# Patient Record
Sex: MALE
[De-identification: names, ages, dates, MRNs, and addresses within clinical notes are randomized per-mention and may not be internally consistent; named-entity substitution may affect disease eponyms.]

---

## 2019-02-15 ENCOUNTER — HOSPITAL ENCOUNTER (INPATIENT)
Dept: HOSPITAL 14 - H.ER | Age: 74
LOS: 7 days | Discharge: NURSING, REHAB OR LONG-TERM CARE | DRG: 27 | End: 2019-02-22
Attending: FAMILY MEDICINE | Admitting: FAMILY MEDICINE
Payer: MEDICARE

## 2019-02-15 VITALS — BODY MASS INDEX: 24.9 KG/M2

## 2019-02-15 DIAGNOSIS — E11.9: ICD-10-CM

## 2019-02-15 DIAGNOSIS — S06.5X0A: Primary | ICD-10-CM

## 2019-02-15 DIAGNOSIS — Z79.82: ICD-10-CM
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
## 2019-02-15 DIAGNOSIS — E86.0: ICD-10-CM

## 2019-02-15 DIAGNOSIS — Z79.84: ICD-10-CM

## 2019-02-15 DIAGNOSIS — F03.90: ICD-10-CM
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
## 2019-02-15 DIAGNOSIS — E78.00: ICD-10-CM

## 2019-02-15 DIAGNOSIS — M19.90: ICD-10-CM

## 2019-02-15 DIAGNOSIS — I10: ICD-10-CM

## 2019-02-15 DIAGNOSIS — G25.0: ICD-10-CM

## 2019-02-15 DIAGNOSIS — R26.89: ICD-10-CM

## 2019-02-15 DIAGNOSIS — F32.9: ICD-10-CM

## 2019-02-15 DIAGNOSIS — E78.5: ICD-10-CM

## 2019-02-15 LAB
ALBUMIN SERPL-MCNC: 4.8 G/DL (ref 3.5–5)
ALBUMIN/GLOB SERPL: 1.4 {RATIO} (ref 1–2.1)
ALT SERPL-CCNC: 48 U/L (ref 21–72)
ANISOCYTOSIS BLD QL SMEAR: SLIGHT
APTT BLD: 34.3 SECONDS (ref 25.6–37.1)
AST SERPL-CCNC: 52 U/L (ref 17–59)
BASE EXCESS BLDV CALC-SCNC: 1 MMOL/L (ref 0–2)
BASOPHILS # BLD AUTO: 0 K/UL (ref 0–0.2)
BASOPHILS NFR BLD: 0.6 % (ref 0–2)
BUN SERPL-MCNC: 38 MG/DL (ref 9–20)
CALCIUM SERPL-MCNC: 9.9 MG/DL (ref 8.4–10.2)
EOSINOPHIL # BLD AUTO: 0 K/UL (ref 0–0.7)
EOSINOPHIL NFR BLD: 0.2 % (ref 0–4)
ERYTHROCYTE [DISTWIDTH] IN BLOOD BY AUTOMATED COUNT: 14.4 % (ref 11.5–14.5)
GFR NON-AFRICAN AMERICAN: > 60
GIANT PLATELETS BLD QL SMEAR: PRESENT
HGB BLD-MCNC: 15.9 G/DL (ref 12–18)
INR PPP: 1.1
LG PLATELETS BLD QL SMEAR: PRESENT
LYMPHOCYTE: 9 % (ref 20–50)
LYMPHOCYTES # BLD AUTO: 0.6 K/UL (ref 1–4.3)
LYMPHOCYTES NFR BLD AUTO: 7.9 % (ref 20–40)
MCH RBC QN AUTO: 31.8 PG (ref 27–31)
MCHC RBC AUTO-ENTMCNC: 33.6 G/DL (ref 33–37)
MCV RBC AUTO: 94.7 FL (ref 80–94)
MONOCYTE: 1 % (ref 0–10)
MONOCYTES # BLD: 0.4 K/UL (ref 0–0.8)
MONOCYTES NFR BLD: 5.1 % (ref 0–10)
NEUTROPHILS # BLD: 6.6 K/UL (ref 1.8–7)
NEUTROPHILS NFR BLD AUTO: 86 % (ref 42–75)
NEUTROPHILS NFR BLD AUTO: 86.2 % (ref 50–75)
NEUTS BAND NFR BLD: 4 % (ref 0–2)
NRBC BLD AUTO-RTO: 0 % (ref 0–0)
PCO2 BLDV: 42 MMHG (ref 40–60)
PH BLDV: 7.4 [PH] (ref 7.32–7.43)
PLATELET # BLD EST: NORMAL 10*3/UL
PLATELET # BLD: 219 K/UL (ref 130–400)
PMV BLD AUTO: 9.1 FL (ref 7.2–11.7)
POIKILOCYTOSIS BLD QL SMEAR: SLIGHT
PROTHROMBIN TIME: 12.9 SECONDS (ref 9.8–13.1)
RBC # BLD AUTO: 5 MIL/UL (ref 4.4–5.9)
TOTAL CELLS COUNTED BLD: 100
VENOUS BLOOD FIO2: 21 %
VENOUS BLOOD GAS PO2: 62 MM/HG (ref 30–55)
WBC # BLD AUTO: 7.6 K/UL (ref 4.8–10.8)

## 2019-02-15 NOTE — CARD
--------------- APPROVED REPORT --------------





Date of service: 02/15/2019



EKG Measurement

Heart [id number]

UT [id number]

[id number]

[id number]

[id number]



<Conclusion>

Normal sinus rhythm

Possible inferior infarct, age undetermined

Abnormal Electrocardiogram

## 2019-02-15 NOTE — RAD
Date of service: 



02/15/2019



PROCEDURE:  Radiographs of the Chest and Left Ribs.



HISTORY:

fall



COMPARISON:

None available. 



TECHNIQUE:

Frontal radiograph of the chest and multiple oblique radiographs of 

the left ribs were obtained.



FINDINGS:



LEFT RIBS:

No fracture or focal lesion visualized.



LUNGS:

Clear.



PLEURA:

No pneumothorax or pleural fluid.



CARDIOVASCULAR:

Normal cardiac size. No pulmonary vascular congestion. 



No aortic atherosclerotic calcification present



OTHER FINDINGS:

None.



IMPRESSION:

Unremarkable radiographs of the chest and left ribs. No left rib 

fracture.

## 2019-02-15 NOTE — CP.PCM.PN
Subjective





- Date & Time of Evaluation


Date of Evaluation: 02/15/19


Time of Evaluation: 14:52





- Subjective


Subjective: 





Pt fell yesterday 


has gait difficulty


he is awake alert confused


JUAN PABLO EOM full


good st


no sensory defictis


no drift


CT large chronic SDH Right side


Would benefit from elective craniotomy and evacuation


Son left so I will need to speak to him to obtain consent


will leave consent fo son to sign after I speak to him


If there is achnge neurologically will evacuate sooner








Objective





- Vital Signs/Intake and Output


Vital Signs (last 24 hours): 


                                        











Temp Pulse Resp BP Pulse Ox


 


 98 F   69   19   134/80   98 


 


 02/15/19 10:27  02/15/19 13:05  02/15/19 13:05  02/15/19 13:05  02/15/19 13:34











- Labs


Labs: 


                                        





                                 02/15/19 11:15 





                                 02/15/19 11:15 





                                        











PT  12.9 Seconds (9.8-13.1)   02/15/19  11:15    


 


INR  1.1   02/15/19  11:15    


 


APTT  34.3 Seconds (25.6-37.1)   02/15/19  11:15

## 2019-02-15 NOTE — CT
Date of service: 



02/15/2019



PROCEDURE:  CT HEAD WITHOUT CONTRAST.



HISTORY:

headache



COMPARISON:

None available.



TECHNIQUE:

Axial computed tomography images were obtained through the head/brain 

without intravenous contrast.  



Radiation dose:



Total exam DLP = 950.52 mGy-cm.



This CT exam was performed using one or more of the following dose 

reduction techniques: Automated exposure control, adjustment of the 

mA and/or kV according to patient size, and/or use of iterative 

reconstruction technique.



FINDINGS:



HEMORRHAGE:

. the subdural extends approximately 10.5 cm along the right frontal 

convexity.  It measures approximately 3.1 cm in greatest width.  This 

subdural hemorrhages mildly loculated.  There is no other 

intracranial hemorrhage identified. 



BRAIN:

No intracranial mass.  No significant atrophy.  Mild periventricular 

white matter lucency consistent with chronic microvascular ischemic 

change.  No evidence of acute infarct.



VENTRICLES:

No hydrocephalus.  There is midline shift towards the left of 

approximately 4 mm.  There is no downward herniation.  The 

perimesencephalic cistern is preserved. 



CALVARIUM:

Unremarkable.



PARANASAL SINUSES:

No evidence of sinusitis.  There is a deviated nasal septum towards 

the right side in association with a bony spur.



MASTOID AIR CELLS:

Unremarkable as visualized. No inflammatory changes.



OTHER FINDINGS:

None.



IMPRESSION:

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 

width and 10.5 cm length.  4 mm midline shift towards the left as a 

result of mass effect from the subdural collection.  Mildly 

loculated..  No other significant abnormality.

## 2019-02-15 NOTE — ED PDOC
HPI: Trauma/Fall





- HPI


History Per: Patient


Injury Occurred (Timing): Days Ago: (3)


Description Of Injury (Context): Head Trauma


Location Of Injury: Left: Chest, Posterior: Head


Pain Scale Rating Of: 3


Additional Complaint(s): 


Pt is a 72 y/o male brought to ED by EMT called by neighbors home health aid as 

pt was on the floor for 2 days after a fall. Pt states he fell on Tuesday after 

slipping and struck his the back of his head when he tried to get up again. He 

denies LOC. States he was unable to get up because he did not have enough 

strength to pull himself up. He remained on the floor for 2 days during this 

time he did not eat and ended up urinating on himself as he could not go to the 

bathroom. He  normally walks with a cane and was not using one at the time of 

his fall. Reports a similar fall at [facility] week prior. At the moment, 

complains of left rib pain and mild headache. Denies dizziness, blurry vision, 

chest pain, shortness of breath, recent illness (cough/congestion, fevers, 

diarrhea, n/v, dysuria), neck, back, abdominal, hip or knee pain, or hx of 

seizures in the past. 





PMD: Dr. Katz


PMHX: HTN, DM (non inuslin dependent), Depression, HTN, Unable to r/o dementia


PSurg: Denies


Meds: reveiwed


NKDA


Social: Lives alone, as family in area, has , denies smoking, heavy 

alcohol use, or drug use








<Chava Mariscal - Last Filed: 02/15/19 13:32>





<Nick Sanchez - Last Filed: 02/15/19 13:51>





- HPI


Time Seen by Provider: 02/15/19 10:25


Chief Complaint (Nursing): Trauma





Supervising Attending Note





- Supervising Attending Note


The Documented history was done by the: Physician Extender


The documented physical exam was done by the: Physician Extender


The documented procedures were done by the: Physician Extender





- Attestation:


I have personally seen and examined this patient.: Yes


I have fully participated in the care of the patient.: Yes


I have reviewed all pertinent clinical information, including history, physical 

exam and plan: Yes





- Notes:


Notes:: 





Head injury from fall 





<Nick Sanchez M - Last Filed: 02/15/19 13:51>





Past Medical History


Reviewed: Historical Data, Nursing Documentation, Vital Signs


Vital Signs: 





                                Last Vital Signs











Temp  98 F   02/15/19 10:27


 


Pulse  90   02/15/19 10:27


 


Resp  19   02/15/19 10:27


 


BP  143/89   02/15/19 10:27


 


Pulse Ox  98   02/15/19 10:27














- Medical History


PMH: Cardia Arrhythmia, Depression, HTN, Hypercholesterolemia





- Surgical History


Surgical History: No Surg Hx





- Family History


Family History: States: No Known Family Hx





- Living Arrangements


Living Arrangements: With Family





- Social History


Current smoker - smoking cessation education provided: No





<Chava Mariscal - Last Filed: 02/15/19 13:32>


Vital Signs: 





                                Last Vital Signs











Temp  98 F   02/15/19 10:27


 


Pulse  69   02/15/19 13:05


 


Resp  19   02/15/19 13:05


 


BP  134/80   02/15/19 13:05


 


Pulse Ox  98   02/15/19 13:34














<Nick Sanchez - Last Filed: 02/15/19 13:51>





- Home Medications


Home Medications: 


                                Ambulatory Orders











 Medication  Instructions  Recorded


 


Aspirin [Ecotrin] 81 mg PO DAILY 02/15/19


 


Atorvastatin [Lipitor] 20 mg PO DAILY 02/15/19


 


Cyanocobalamin [Vitamin B12] 250 mcg PO DAILY 02/15/19


 


Dapagliflozin/Metformin HCl 1 tab PO BID 02/15/19





[Xigduo Xr 5 mg-1,000 mg Tablet]  


 


Ramipril [Altace] 2.5 mg PO DAILY 02/15/19


 


Risperidone [Risperdal] 1 mg PO BID 02/15/19


 


Sertraline HCl 100 mg PO DAILY 02/15/19














- Allergies


Allergies/Adverse Reactions: 


                                    Allergies











Allergy/AdvReac Type Severity Reaction Status Date / Time


 


No Known Allergies Allergy   Verified 02/15/19 10:27














Review of Systems


Constitutional: Negative for: Fever, Chills


Respiratory: Negative for: Cough, Shortness of Breath


Gastrointestinal: Negative for: Nausea, Vomiting, Abdominal Pain, Diarrhea


Genitourinary Male: Negative for: Dysuria


Musculoskeletal: Negative for: Neck Pain, Shoulder Pain, Arm Pain, Back Pain, 

Hand Pain





<Chava Mariscal - Last Filed: 02/15/19 13:32>





Physical Exam





- Physical Exam


Appears: Positive for: No Acute Distress (Calm appearing elderly gentlemen, 

discheveled, smells of urine)


Head Exam: Negative for: ATRAUMATIC (Erythematous region over posterior scalp, 

no visible swelling, no palpable tenderness)


Skin: Positive for: Dry


Eye Exam: Positive for: Normal appearance, EOMI, PERRL


ENT: Positive for: Normal ENT Inspection


Neck: Positive for: Normal, Painless ROM


Cardiovascular/Chest: Positive for: Regular Rate, Rhythm.  Negative for: Chest 

Non Tender (Tenderness to palpation left inferior ribs w/ yellow bruising), JVD,

 Murmur


Respiratory: Positive for: Normal Breath Sounds.  Negative for: Accessory Muscle

 Use, Crackles, Rales, Rhonchi, Wheezing


Gastrointestinal/Abdominal: Positive for: Normal Exam, Bowel Sounds, Soft.  

Negative for: Tenderness


Back: Positive for: Normal Inspection.  Negative for: Vertebral Tenderness


Extremity: Positive for: Normal ROM, Other.  Negative for: Pedal Edema


Neurologic/Psych: Positive for: Alert, Oriented, Mood/Affect (full range), Other

 (Power 5/5 of upper and lower extremities. Full ROM of Neck, thoracic and 

lumbar spine, shoulder, elbow, hip and knee joints w/o any difficulty. Gait 

normal ).  Negative for: CNs II-XII, Motor/Sensory Deficits, Aphasia, Facial 

Droop





<Chava Mariscal - Last Filed: 02/15/19 13:32>





- Physical Exam


Neck: Positive for: Normal, Painless ROM


Neurologic/Psych: Positive for: Alert, CNs II-XII, Oriented.  Negative for: 

Motor/Sensory Deficits, Aphasia





<Nick Sanchez - Last Filed: 02/15/19 13:51>





- Laboratory Results


Result Diagrams: 


                                 02/15/19 11:15





                                 02/15/19 11:15





- ECG


O2 Sat by Pulse Oximetry: 98





<Chava Mariscal - Last Filed: 02/15/19 13:32>





- Laboratory Results


Result Diagrams: 


                                 02/15/19 11:15





                                 02/15/19 11:15


Lab Results: 





                                        











pO2  62 mm/Hg (30-55)  H  02/15/19  12:55    


 


VBG pH  7.40  (7.32-7.43)   02/15/19  12:55    


 


VBG pCO2  42 mmHg (40-60)   02/15/19  12:55    


 


VBG HCO3  25.5 mmol/L  02/15/19  12:55    


 


VBG Total CO2  27.3 mmol/L (22-28)   02/15/19  12:55    


 


VBG O2 Sat (Calc)  92.9 % (40-65)  H  02/15/19  12:55    


 


VBG Base Excess  1.0 mmol/L (0.0-2.0)   02/15/19  12:55    


 


VBG Potassium  4.2 mmol/L (3.6-5.2)   02/15/19  12:55    


 


Sodium  141.0 mmol/L (132-148)   02/15/19  12:55    


 


Chloride  110.0 mmol/L ()  H  02/15/19  12:55    


 


Glucose  150 mg/dL ()  H  02/15/19  12:55    


 


Lactate  1.3 mmol/L (0.7-2.1)   02/15/19  12:55    


 


FiO2  21.0 %  02/15/19  12:55    








                                        











PT  12.9 Seconds (9.8-13.1)   02/15/19  11:15    


 


INR  1.1   02/15/19  11:15    


 


APTT  34.3 Seconds (25.6-37.1)   02/15/19  11:15    








                                        











Troponin I  < 0.0120 ng/mL (0.00-0.120)   02/15/19  11:15    








                                        











Total Bilirubin  0.9 mg/dl (0.2-1.3)   02/15/19  11:15    


 


AST  52 U/L (17-59)   02/15/19  11:15    


 


ALT  48 U/L (21-72)   02/15/19  11:15    


 


Alkaline Phosphatase  89 U/L ()   02/15/19  11:15    


 


Total Protein  8.1 G/DL (6.3-8.2)   02/15/19  11:15    


 


Albumin  4.8 g/dL (3.5-5.0)   02/15/19  11:15    


 


Globulin  3.3 gm/dL (2.2-3.9)   02/15/19  11:15    


 


Albumin/Globulin Ratio  1.4  (1.0-2.1)   02/15/19  11:15    














- ECG


Pulse Ox Interpretation: Normal





- Radiology


X-Ray: Read By Radiologist


X-Ray Interpretation: No Acute Disease





- CT Scan/US


  ** ct


Other Rad Studies (CT/US): Read By Radiologist


Other Rad Interpretation: no acute





- Progress


ED Course And Treament: 





1343:  Stable.  AAOx3.  Spoke with neurosurgery.  Will admit.  Will need 

surgery, but not likely today.  Will see pt. later today.  Spoke with Dr. Katz

.  Will admit.  Spoke with ICU, will admit. 





- Critical Care


Total Time (In Min): 30


Documented Critical Care: Time excludes all time spent performint seperately 

billable procedures





<Nick Sanchez - Last Filed: 02/15/19 13:51>





Medical Decision Making


Medical Decision Makin y/o gentleman brought to ED s/p Mechanical fall w/ head trauma 3 days ago in

 which he could not get up from floor. Currently with headache and rib pain.  

Hemodynamically stable. Accucheck 248.





CBC


CMP


CPK


UA


Head CT


Rib Series Left PA


NS 1 L bolus


VBG


EKG


Troponin


 


1323hr


Head CT report- subdural hemorrhage w/ 4mm midline shift. Call made to 

Neurosugery, Dr. Leach


Pt's son at bedside, discussed findings


HOB elevated, NPO


Neurocheck: Pt denies any headache, is AOx3 and still has no neurological 

deficits





1333hr


Dr. Sanchez discussed case with Dr. Leach, will come to ED to evaluate, 

possible OR today or tomorrow. Pt to be admitted to ICU. 


Plan discussed with Son at bedside








<Chava Mariscal - Last Filed: 02/15/19 13:32>





Disposition





<Chava Mariscal - Last Filed: 02/15/19 13:32>





- Patient ED Disposition


Is Patient to be Admitted: Yes


Counseled Patient/Family Regarding: Studies Performed, Diagnosis





- Disposition


Disposition Time: 10:45





- Pt Status Changed To:


Hospital Disposition Of: Inpatient





- Admit Certification


Admit to Inpatient:: After my assessment, the patient will require 

hospitalization for at least two midnights.  This is because of the severity of 

symptoms shown, intensity of services needed, and/or the medical risk in this 

patient being treated as an outpatient.





- POA


Present On Arrival: Falls Or Trauma





<Nick Sanchez - Last Filed: 02/15/19 13:51>





- Clinical Impression


Clinical Impression: 


 Subdural hematoma








- Disposition


Condition: FAIR

## 2019-02-15 NOTE — CP.CCUPN
CCU Subjective





- Physician Review


Subjective (Free Text): 





ICU Admission from ER, discussed with ER MD:


73M with PMH HTN, DM II, Dementia (type unknown), fell at home and has been 

flood bound x 2 days until found by home health aide, incontinent of urine, and 

unable to get up nor self-mobilize. This was the 2nd known fall within a 2 week 

period. He is awake, alert, conversant in Portuguese, denies any headaches, 

dizziness, focal weakness, visual changes, palpitations, chest discomfort, SOB, 

diaphoresis, nausea, nor any abdominal or other limb pain. Admits to Left sided 

mid-chest/ rib pain, no recent fevers/ chills, cough, no known h/o seizures nor 

other cardiac illnesses. In ER, was 134/80, HR 66, RR 19, 98% on RA and appears 

comfortable an in pleasant, friendly mood. Preliminary CT Brain shows a R-sided 

SDH with mild midline shift. 





Other vitals and I/O's reviewed.  


Allergies: NKDA


ROS:  No other pertinent negs or positives on 10+  system review. 





Home meds:  ASA, Lipitor, B12, Dapaglif/metformin, Ramipril, Rosperdal, 

Sertraline


PMSFH:   All other Nursing and physician documentation reviewed to date; no new 

pertinent info noted relevant to current medical problems.








EXAM- 


HEENT:  no icterus, no gaze preference, equal Pupils, both reactive, no 

nystagmus 


NECK: no JVD visible, supple, carotids equal upstroke bilat/no bruit


CHEST: clear BS  bilat, no wheezes audible, mild tenderness on palpation over 

Left 5th -6th lateral ribs


HEART: regular, distant, S1S2, no rubs


ABD:  soft, no distension, no focal  tenderness, no tympany, no guarding, no 

organomegaly, BS hypoactive.  


EXT:   no LE edema, no mottling;  no calf tenderness or palpable cords, distal 

pulses intact and symmetrical, no cyanosis; small bruise with scar over L elbow,

small ecchymosis over L lateral biceps area.


NEURO:   no gross focal motor deficits, sensory intact bilat, GCS= 15.  


SKIN:   no rashes,  warm and dry





LABS:  


WBC= 7.6


HGB= 15.9


PLTs=  219K





Na= 142


K=  4.1


CL= 102


HCO3= 24


BUN/Cr=  38//0.6


BS= 124





CXR (my interp)- No rib fractures seen, lung fields clear bilat.   





CT Head:  results reviewed. 





EKG:  sinus 78/min, old IWMI, LAD





IMPRESSION / MAJOR PROBLEMS NOW:  


1.   S/p Fall at Home


2.   R SDH with elements of chronicity, with small midline shift.  


3.   Azotemia /dehydration


4.   r/o Acute Rhabdomyolysis


5.   h/o Dementia





PLAN: 


1.   NeuroSurg eval, Neurochecks, HOB elevation, SCDs, keep SBP no higher than 

130-140, maintain normogylcemia, IVF hydration. Repeat Brain imaging as per 

Neurology / NeuroSurg, or with any acute deterioration evident. 


2.   Repeat EKG in AM


3.   Serial CPK.


4.   Hold all CNS-acting meds: Risperdal, Sertraline.

## 2019-02-16 LAB
ALBUMIN SERPL-MCNC: 3.6 G/DL (ref 3.5–5)
ALBUMIN/GLOB SERPL: 1.3 {RATIO} (ref 1–2.1)
ALT SERPL-CCNC: 40 U/L (ref 21–72)
AST SERPL-CCNC: 44 U/L (ref 17–59)
BASOPHILS # BLD AUTO: 0 K/UL (ref 0–0.2)
BASOPHILS NFR BLD: 0.5 % (ref 0–2)
BUN SERPL-MCNC: 27 MG/DL (ref 9–20)
CALCIUM SERPL-MCNC: 8.7 MG/DL (ref 8.4–10.2)
EOSINOPHIL # BLD AUTO: 0.2 K/UL (ref 0–0.7)
EOSINOPHIL NFR BLD: 4 % (ref 0–4)
ERYTHROCYTE [DISTWIDTH] IN BLOOD BY AUTOMATED COUNT: 14.2 % (ref 11.5–14.5)
GFR NON-AFRICAN AMERICAN: > 60
HGB BLD-MCNC: 13.5 G/DL (ref 12–18)
LYMPHOCYTES # BLD AUTO: 1.6 K/UL (ref 1–4.3)
LYMPHOCYTES NFR BLD AUTO: 27.1 % (ref 20–40)
MCH RBC QN AUTO: 31.9 PG (ref 27–31)
MCHC RBC AUTO-ENTMCNC: 33.6 G/DL (ref 33–37)
MCV RBC AUTO: 94.8 FL (ref 80–94)
MONOCYTES # BLD: 0.4 K/UL (ref 0–0.8)
MONOCYTES NFR BLD: 6.9 % (ref 0–10)
NEUTROPHILS # BLD: 3.7 K/UL (ref 1.8–7)
NEUTROPHILS NFR BLD AUTO: 61.5 % (ref 50–75)
NRBC BLD AUTO-RTO: 0.1 % (ref 0–0)
PLATELET # BLD: 195 K/UL (ref 130–400)
PMV BLD AUTO: 9.4 FL (ref 7.2–11.7)
RBC # BLD AUTO: 4.23 MIL/UL (ref 4.4–5.9)
WBC # BLD AUTO: 6 K/UL (ref 4.8–10.8)

## 2019-02-16 NOTE — CP.[email or web address]
CCU Subjective





- Physician Review


Events Since Last Encounter (Free Text): 





Patient awake, no distress, no fever, no vomiting, events reviewed








CCU Objective





- Vital Signs / Intake & Output


Vital Signs (Last 4 hours): 


Vital Signs











  Temp Pulse Resp BP Pulse Ox


 


 02/16/19 08:37     126/66 


 


 02/16/19 08:00  98.3 F  66  14  126/66  97


 


 02/16/19 06:00   68  14  125/69  97











Intake and Output (Last 8hrs): 


                                 Intake & Output











 02/15/19 02/16/19 02/16/19





 22:59 06:59 14:59


 


Intake Total 350  


 


Balance 350  


 


Weight  158 lb 9.6 oz 


 


Intake:   


 


    


 


  Oral 250  














- Physical Exam


Head: Positive for: Normocephalic


Pupils: Positive for: PERRL


Conjunctiva: Positive for: Normal


Ears: Positive for: Normal


Mouth: Positive for: Dry


Pharnyx: Positive for: Normal


Nose (External): Positive for: Atraumatic


Neck: Positive for: Normal Range of Motion


Respiratory/Chest: Positive for: Clear to Auscultation


Cardiovascular: Positive for: Regular Rate and Rhythm


Abdomen: Positive for: Normal Bowel Sounds


Upper Extremity: Positive for: Normal Inspection


Lower Extremity: Positive for: Normal Inspection


Psychiatric: Positive for: Alert





- Medications


Active Medications: 


Active Medications











Generic Name Dose Route Start Last Admin





  Trade Name Freq  PRN Reason Stop Dose Admin


 


Acetaminophen  650 mg  02/15/19 17:20  





  Tylenol 325mg Tab  PO   





  Q6 PRN   





  Headache   





     





     





     


 


Acetaminophen  650 mg  02/16/19 06:22  





  Tylenol 325mg Tab  PO   





  Q4 PRN   





  Other   





     





     





     


 


Atorvastatin Calcium  20 mg  02/16/19 09:00  02/16/19 08:37





  Lipitor  PO   20 mg





  DAILY ARLET   Administration





     





     





     





     


 


Sodium Chloride  1,000 mls @ 125 mls/hr  02/15/19 17:30  02/16/19 03:39





  Sodium Chloride 0.9%  IV  02/16/19 17:22  125 mls/hr





  .Q8H ARLET   Administration





     





     





     





     


 


Insulin Human Regular  0 units  02/15/19 23:00  02/16/19 07:51





  Humulin R  SC   Not Given





  ACCU-CHECK ARLET   





     





     





  Protocol   





     


 


Ramipril  2.5 mg  02/16/19 09:00  02/16/19 08:37





  Altace  PO   2.5 mg





  DAILY ARLET   Administration





     





     





     





     


 


Tramadol HCl  50 mg  02/16/19 06:23  02/16/19 06:32





  Ultram  PO   50 mg





  Q4 PRN   Administration





  Pain, severe (8-10)   





     





     





     














- Patient Studies


Lab Studies: 


                                   Lab Studies











  02/16/19 02/16/19 02/15/19 Range/Units





  05:30 05:30 22:38 


 


WBC   6.0   (4.8-10.8)  K/uL


 


RBC   4.23 L   (4.40-5.90)  Mil/uL


 


Hgb   13.5  D   (12.0-18.0)  g/dL


 


Hct   40.1   (35.0-51.0)  %


 


MCV   94.8 H   (80.0-94.0)  fl


 


MCH   31.9 H   (27.0-31.0)  pg


 


MCHC   33.6   (33.0-37.0)  g/dL


 


RDW   14.2   (11.5-14.5)  %


 


Plt Count   195   (130-400)  K/uL


 


MPV   9.4   (7.2-11.7)  fl


 


Neut % (Auto)   61.5   (50.0-75.0)  %


 


Lymph % (Auto)   27.1   (20.0-40.0)  %


 


Mono % (Auto)   6.9   (0.0-10.0)  %


 


Eos % (Auto)   4.0   (0.0-4.0)  %


 


Baso % (Auto)   0.5   (0.0-2.0)  %


 


Neut # (Auto)   3.7   (1.8-7.0)  K/uL


 


Lymph # (Auto)   1.6   (1.0-4.3)  K/uL


 


Mono # (Auto)   0.4   (0.0-0.8)  K/uL


 


Eos # (Auto)   0.2   (0.0-0.7)  K/uL


 


Baso # (Auto)   0.0   (0.0-0.2)  K/uL


 


Neutrophils % (Manual)     (42-75)  %


 


Band Neutrophils %     (0-2)  %


 


Lymphocytes % (Manual)     (20-50)  %


 


Monocytes % (Manual)     (0-10)  %


 


Platelet Estimate     (NORMAL)  


 


Large Platelets     


 


Giant Platelets     


 


Poikilocytosis (manual     


 


Anisocytosis (manual)     


 


[place] Cells     


 


Acanthocytes (Spur)     


 


PT     (9.8-13.1)  Seconds


 


INR     


 


APTT     (25.6-37.1)  Seconds


 


pO2     (30-55)  mm/Hg


 


VBG pH     (7.32-7.43)  


 


VBG pCO2     (40-60)  mmHg


 


VBG HCO3     mmol/L


 


VBG Total CO2     (22-28)  mmol/L


 


VBG O2 Sat (Calc)     (40-65)  %


 


VBG Base Excess     (0.0-2.0)  mmol/L


 


VBG Potassium     (3.6-5.2)  mmol/L


 


Glucose     ()  mg/dL


 


Lactate     (0.7-2.1)  mmol/L


 


FiO2     %


 


Sodium  140    (132-148)  mmol/l


 


Potassium  3.8    (3.6-5.0)  MMOL/L


 


Chloride  103    ()  mmol/L


 


Carbon Dioxide  26    (22-30)  mmol/L


 


Anion Gap  15    (10-20)  


 


BUN  27 H    (9-20)  mg/dl


 


Creatinine  0.8    (0.8-1.5)  mg/dl


 


Est GFR (African Amer)  > 60    


 


Est GFR (Non-Af Amer)  > 60    


 


POC Glucose (mg/dL)    146 H  ()  mg/dL


 


Random Glucose  142 H    ()  mg/dL


 


Calcium  8.7    (8.4-10.2)  mg/dL


 


Phosphorus  3.1    (2.5-4.5)  mg/dl


 


Magnesium  2.0    (1.6-2.3)  MG/DL


 


Total Bilirubin  0.8    (0.2-1.3)  mg/dl


 


AST  44    (17-59)  U/L


 


ALT  40    (21-72)  U/L


 


Alkaline Phosphatase  72    ()  U/L


 


Total Creatine Kinase  245 H    ()  U/L


 


Troponin I     (0.00-0.120)  ng/mL


 


Total Protein  6.4    (6.3-8.2)  G/DL


 


Albumin  3.6    (3.5-5.0)  g/dL


 


Globulin  2.9    (2.2-3.9)  gm/dL


 


Albumin/Globulin Ratio  1.3    (1.0-2.1)  


 


Venous Blood Potassium     (3.6-5.2)  mmol/L














  02/15/19 02/15/19 02/15/19 Range/Units





  17:43 13:03 12:55 


 


WBC     (4.8-10.8)  K/uL


 


RBC     (4.40-5.90)  Mil/uL


 


Hgb     (12.0-18.0)  g/dL


 


Hct     (35.0-51.0)  %


 


MCV     (80.0-94.0)  fl


 


MCH     (27.0-31.0)  pg


 


MCHC     (33.0-37.0)  g/dL


 


RDW     (11.5-14.5)  %


 


Plt Count     (130-400)  K/uL


 


MPV     (7.2-11.7)  fl


 


Neut % (Auto)     (50.0-75.0)  %


 


Lymph % (Auto)     (20.0-40.0)  %


 


Mono % (Auto)     (0.0-10.0)  %


 


Eos % (Auto)     (0.0-4.0)  %


 


Baso % (Auto)     (0.0-2.0)  %


 


Neut # (Auto)     (1.8-7.0)  K/uL


 


Lymph # (Auto)     (1.0-4.3)  K/uL


 


Mono # (Auto)     (0.0-0.8)  K/uL


 


Eos # (Auto)     (0.0-0.7)  K/uL


 


Baso # (Auto)     (0.0-0.2)  K/uL


 


Neutrophils % (Manual)     (42-75)  %


 


Band Neutrophils %     (0-2)  %


 


Lymphocytes % (Manual)     (20-50)  %


 


Monocytes % (Manual)     (0-10)  %


 


Platelet Estimate     (NORMAL)  


 


Large Platelets     


 


Giant Platelets     


 


Poikilocytosis (manual     


 


Anisocytosis (manual)     


 


Ale Cells     


 


Acanthocytes (Spur)     


 


PT     (9.8-13.1)  Seconds


 


INR     


 


APTT     (25.6-37.1)  Seconds


 


pO2    62 H  (30-55)  mm/Hg


 


VBG pH    7.40  (7.32-7.43)  


 


VBG pCO2    42  (40-60)  mmHg


 


VBG HCO3    25.5  mmol/L


 


VBG Total CO2    27.3  (22-28)  mmol/L


 


VBG O2 Sat (Calc)    92.9 H  (40-65)  %


 


VBG Base Excess    1.0  (0.0-2.0)  mmol/L


 


VBG Potassium    4.2  (3.6-5.2)  mmol/L


 


Glucose    150 H  ()  mg/dL


 


Lactate    1.3  (0.7-2.1)  mmol/L


 


FiO2    21.0  %


 


Sodium    141.0  (132-148)  mmol/l


 


Potassium     (3.6-5.0)  MMOL/L


 


Chloride    110.0 H  ()  mmol/L


 


Carbon Dioxide     (22-30)  mmol/L


 


Anion Gap     (10-20)  


 


BUN     (9-20)  mg/dl


 


Creatinine     (0.8-1.5)  mg/dl


 


Est GFR (African Amer)     


 


Est GFR (Non-Af Amer)     


 


POC Glucose (mg/dL)  145 H  128 H   ()  mg/dL


 


Random Glucose     ()  mg/dL


 


Calcium     (8.4-10.2)  mg/dL


 


Phosphorus     (2.5-4.5)  mg/dl


 


Magnesium     (1.6-2.3)  MG/DL


 


Total Bilirubin     (0.2-1.3)  mg/dl


 


AST     (17-59)  U/L


 


ALT     (21-72)  U/L


 


Alkaline Phosphatase     ()  U/L


 


Total Creatine Kinase     ()  U/L


 


Troponin I     (0.00-0.120)  ng/mL


 


Total Protein     (6.3-8.2)  G/DL


 


Albumin     (3.5-5.0)  g/dL


 


Globulin     (2.2-3.9)  gm/dL


 


Albumin/Globulin Ratio     (1.0-2.1)  


 


Venous Blood Potassium    4.2  (3.6-5.2)  mmol/L














  02/15/19 02/15/19 02/15/19 Range/Units





  11:15 11:15 11:15 


 


WBC    7.6  (4.8-10.8)  K/uL


 


RBC    5.00  (4.40-5.90)  Mil/uL


 


Hgb    15.9  (12.0-18.0)  g/dL


 


Hct    47.3  (35.0-51.0)  %


 


MCV    94.7 H  (80.0-94.0)  fl


 


MCH    31.8 H  (27.0-31.0)  pg


 


MCHC    33.6  (33.0-37.0)  g/dL


 


RDW    14.4  (11.5-14.5)  %


 


Plt Count    219  (130-400)  K/uL


 


MPV    9.1  (7.2-11.7)  fl


 


Neut % (Auto)    86.2 H  (50.0-75.0)  %


 


Lymph % (Auto)    7.9 L  (20.0-40.0)  %


 


Mono % (Auto)    5.1  (0.0-10.0)  %


 


Eos % (Auto)    0.2  (0.0-4.0)  %


 


Baso % (Auto)    0.6  (0.0-2.0)  %


 


Neut # (Auto)    6.6  (1.8-7.0)  K/uL


 


Lymph # (Auto)    0.6 L  (1.0-4.3)  K/uL


 


Mono # (Auto)    0.4  (0.0-0.8)  K/uL


 


Eos # (Auto)    0.0  (0.0-0.7)  K/uL


 


Baso # (Auto)    0.0  (0.0-0.2)  K/uL


 


Neutrophils % (Manual)    86 H  (42-75)  %


 


Band Neutrophils %    4 H  (0-2)  %


 


Lymphocytes % (Manual)    9 L  (20-50)  %


 


Monocytes % (Manual)    1  (0-10)  %


 


Platelet Estimate    Normal  (NORMAL)  


 


Large Platelets    Present  


 


Giant Platelets    Present  


 


Poikilocytosis (manual    Slight  


 


Anisocytosis (manual)    Slight  


 


Ale Cells      


 


Acanthocytes (Spur)    Np  


 


PT  12.9    (9.8-13.1)  Seconds


 


INR  1.1    


 


APTT  34.3    (25.6-37.1)  Seconds


 


pO2     (30-55)  mm/Hg


 


VBG pH     (7.32-7.43)  


 


VBG pCO2     (40-60)  mmHg


 


VBG HCO3     mmol/L


 


VBG Total CO2     (22-28)  mmol/L


 


VBG O2 Sat (Calc)     (40-65)  %


 


VBG Base Excess     (0.0-2.0)  mmol/L


 


VBG Potassium     (3.6-5.2)  mmol/L


 


Glucose     ()  mg/dL


 


Lactate     (0.7-2.1)  mmol/L


 


FiO2     %


 


Sodium   142   (132-148)  mmol/l


 


Potassium   4.1   (3.6-5.0)  MMOL/L


 


Chloride   102   ()  mmol/L


 


Carbon Dioxide   24   (22-30)  mmol/L


 


Anion Gap   20   (10-20)  


 


BUN   38 H   (9-20)  mg/dl


 


Creatinine   0.8   (0.8-1.5)  mg/dl


 


Est GFR (African Amer)   > 60   


 


Est GFR (Non-Af Amer)   > 60   


 


POC Glucose (mg/dL)     ()  mg/dL


 


Random Glucose   202 H   ()  mg/dL


 


Calcium   9.9   (8.4-10.2)  mg/dL


 


Phosphorus     (2.5-4.5)  mg/dl


 


Magnesium     (1.6-2.3)  MG/DL


 


Total Bilirubin   0.9   (0.2-1.3)  mg/dl


 


AST   52   (17-59)  U/L


 


ALT   48   (21-72)  U/L


 


Alkaline Phosphatase   89   ()  U/L


 


Total Creatine Kinase   572 H   ()  U/L


 


Troponin I   < 0.0120   (0.00-0.120)  ng/mL


 


Total Protein   8.1   (6.3-8.2)  G/DL


 


Albumin   4.8   (3.5-5.0)  g/dL


 


Globulin   3.3   (2.2-3.9)  gm/dL


 


Albumin/Globulin Ratio   1.4   (1.0-2.1)  


 


Venous Blood Potassium     (3.6-5.2)  mmol/L














  02/15/19 Range/Units





  10:12 


 


WBC   (4.8-10.8)  K/uL


 


RBC   (4.40-5.90)  Mil/uL


 


Hgb   (12.0-18.0)  g/dL


 


Hct   (35.0-51.0)  %


 


MCV   (80.0-94.0)  fl


 


MCH   (27.0-31.0)  pg


 


MCHC   (33.0-37.0)  g/dL


 


RDW   (11.5-14.5)  %


 


Plt Count   (130-400)  K/uL


 


MPV   (7.2-11.7)  fl


 


Neut % (Auto)   (50.0-75.0)  %


 


Lymph % (Auto)   (20.0-40.0)  %


 


Mono % (Auto)   (0.0-10.0)  %


 


Eos % (Auto)   (0.0-4.0)  %


 


Baso % (Auto)   (0.0-2.0)  %


 


Neut # (Auto)   (1.8-7.0)  K/uL


 


Lymph # (Auto)   (1.0-4.3)  K/uL


 


Mono # (Auto)   (0.0-0.8)  K/uL


 


Eos # (Auto)   (0.0-0.7)  K/uL


 


Baso # (Auto)   (0.0-0.2)  K/uL


 


Neutrophils % (Manual)   (42-75)  %


 


Band Neutrophils %   (0-2)  %


 


Lymphocytes % (Manual)   (20-50)  %


 


Monocytes % (Manual)   (0-10)  %


 


Platelet Estimate   (NORMAL)  


 


Large Platelets   


 


Giant Platelets   


 


Poikilocytosis (manual   


 


Anisocytosis (manual)   


 


[place] Cells   


 


Acanthocytes (Spur)   


 


PT   (9.8-13.1)  Seconds


 


INR   


 


APTT   (25.6-37.1)  Seconds


 


pO2   (30-55)  mm/Hg


 


VBG pH   (7.32-7.43)  


 


VBG pCO2   (40-60)  mmHg


 


VBG HCO3   mmol/L


 


VBG Total CO2   (22-28)  mmol/L


 


VBG O2 Sat (Calc)   (40-65)  %


 


VBG Base Excess   (0.0-2.0)  mmol/L


 


VBG Potassium   (3.6-5.2)  mmol/L


 


Glucose   ()  mg/dL


 


Lactate   (0.7-2.1)  mmol/L


 


FiO2   %


 


Sodium   (132-148)  mmol/l


 


Potassium   (3.6-5.0)  MMOL/L


 


Chloride   ()  mmol/L


 


Carbon Dioxide   (22-30)  mmol/L


 


Anion Gap   (10-20)  


 


BUN   (9-20)  mg/dl


 


Creatinine   (0.8-1.5)  mg/dl


 


Est GFR (African Amer)   


 


Est GFR (Non-Af Amer)   


 


POC Glucose (mg/dL)  248 H  ()  mg/dL


 


Random Glucose   ()  mg/dL


 


Calcium   (8.4-10.2)  mg/dL


 


Phosphorus   (2.5-4.5)  mg/dl


 


Magnesium   (1.6-2.3)  MG/DL


 


Total Bilirubin   (0.2-1.3)  mg/dl


 


AST   (17-59)  U/L


 


ALT   (21-72)  U/L


 


Alkaline Phosphatase   ()  U/L


 


Total Creatine Kinase   ()  U/L


 


Troponin I   (0.00-0.120)  ng/mL


 


Total Protein   (6.3-8.2)  G/DL


 


Albumin   (3.5-5.0)  g/dL


 


Globulin   (2.2-3.9)  gm/dL


 


Albumin/Globulin Ratio   (1.0-2.1)  


 


Venous Blood Potassium   (3.6-5.2)  mmol/L








                         Laboratory Results - last 24 hr











  02/15/19 02/15/19 02/15/19





  10:12 11:15 11:15


 


WBC   7.6 


 


RBC   5.00 


 


Hgb   15.9 


 


Hct   47.3 


 


MCV   94.7 H 


 


MCH   31.8 H 


 


MCHC   33.6 


 


RDW   14.4 


 


Plt Count   219 


 


MPV   9.1 


 


Neut % (Auto)   86.2 H 


 


Lymph % (Auto)   7.9 L 


 


Mono % (Auto)   5.1 


 


Eos % (Auto)   0.2 


 


Baso % (Auto)   0.6 


 


Neut # (Auto)   6.6 


 


Lymph # (Auto)   0.6 L 


 


Mono # (Auto)   0.4 


 


Eos # (Auto)   0.0 


 


Baso # (Auto)   0.0 


 


Neutrophils % (Manual)   86 H 


 


Band Neutrophils %   4 H 


 


Lymphocytes % (Manual)   9 L 


 


Monocytes % (Manual)   1 


 


Platelet Estimate   Normal 


 


Large Platelets   Present 


 


Giant Platelets   Present 


 


Poikilocytosis (manual   Slight 


 


Anisocytosis (manual)   Slight 


 


[place] Cells    


 


Acanthocytes (Spur)   Np 


 


PT   


 


INR   


 


APTT   


 


pO2   


 


VBG pH   


 


VBG pCO2   


 


VBG HCO3   


 


VBG Total CO2   


 


VBG O2 Sat (Calc)   


 


VBG Base Excess   


 


VBG Potassium   


 


Glucose   


 


Lactate   


 


FiO2   


 


Sodium    142


 


Potassium    4.1


 


Chloride    102


 


Carbon Dioxide    24


 


Anion Gap    20


 


BUN    38 H


 


Creatinine    0.8


 


Est GFR ( Amer)    > 60


 


Est GFR (Non-Af Amer)    > 60


 


POC Glucose (mg/dL)  248 H  


 


Random Glucose    202 H


 


Calcium    9.9


 


Phosphorus   


 


Magnesium   


 


Total Bilirubin    0.9


 


AST    52


 


ALT    48


 


Alkaline Phosphatase    89


 


Total Creatine Kinase    572 H


 


Troponin I    < 0.0120


 


Total Protein    8.1


 


Albumin    4.8


 


Globulin    3.3


 


Albumin/Globulin Ratio    1.4


 


Venous Blood Potassium   














  02/15/19 02/15/19 02/15/19





  11:15 12:55 13:03


 


WBC   


 


RBC   


 


Hgb   


 


Hct   


 


MCV   


 


MCH   


 


MCHC   


 


RDW   


 


Plt Count   


 


MPV   


 


Neut % (Auto)   


 


Lymph % (Auto)   


 


Mono % (Auto)   


 


Eos % (Auto)   


 


Baso % (Auto)   


 


Neut # (Auto)   


 


Lymph # (Auto)   


 


Mono # (Auto)   


 


Eos # (Auto)   


 


Baso # (Auto)   


 


Neutrophils % (Manual)   


 


Band Neutrophils %   


 


Lymphocytes % (Manual)   


 


Monocytes % (Manual)   


 


Platelet Estimate   


 


Large Platelets   


 


Giant Platelets   


 


Poikilocytosis (manual   


 


Anisocytosis (manual)   


 


Ale Cells   


 


Acanthocytes (Spur)   


 


PT  12.9  


 


INR  1.1  


 


APTT  34.3  


 


pO2   62 H 


 


VBG pH   7.40 


 


VBG pCO2   42 


 


VBG HCO3   25.5 


 


VBG Total CO2   27.3 


 


VBG O2 Sat (Calc)   92.9 H 


 


VBG Base Excess   1.0 


 


VBG Potassium   4.2 


 


Glucose   150 H 


 


Lactate   1.3 


 


FiO2   21.0 


 


Sodium   141.0 


 


Potassium   


 


Chloride   110.0 H 


 


Carbon Dioxide   


 


Anion Gap   


 


BUN   


 


Creatinine   


 


Est GFR ( Amer)   


 


Est GFR (Non-Af Amer)   


 


POC Glucose (mg/dL)    128 H


 


Random Glucose   


 


Calcium   


 


Phosphorus   


 


Magnesium   


 


Total Bilirubin   


 


AST   


 


ALT   


 


Alkaline Phosphatase   


 


Total Creatine Kinase   


 


Troponin I   


 


Total Protein   


 


Albumin   


 


Globulin   


 


Albumin/Globulin Ratio   


 


Venous Blood Potassium   4.2 














  02/15/19 02/15/19 02/16/19





  17:43 22:38 05:30


 


WBC    6.0


 


RBC    4.23 L


 


Hgb    13.5  D


 


Hct    40.1


 


MCV    94.8 H


 


MCH    31.9 H


 


MCHC    33.6


 


RDW    14.2


 


Plt Count    195


 


MPV    9.4


 


Neut % (Auto)    61.5


 


Lymph % (Auto)    27.1


 


Mono % (Auto)    6.9


 


Eos % (Auto)    4.0


 


Baso % (Auto)    0.5


 


Neut # (Auto)    3.7


 


Lymph # (Auto)    1.6


 


Mono # (Auto)    0.4


 


Eos # (Auto)    0.2


 


Baso # (Auto)    0.0


 


Neutrophils % (Manual)   


 


Band Neutrophils %   


 


Lymphocytes % (Manual)   


 


Monocytes % (Manual)   


 


Platelet Estimate   


 


Large Platelets   


 


Giant Platelets   


 


Poikilocytosis (manual   


 


Anisocytosis (manual)   


 


Ale Cells   


 


Acanthocytes (Spur)   


 


PT   


 


INR   


 


APTT   


 


pO2   


 


VBG pH   


 


VBG pCO2   


 


VBG HCO3   


 


VBG Total CO2   


 


VBG O2 Sat (Calc)   


 


VBG Base Excess   


 


VBG Potassium   


 


Glucose   


 


Lactate   


 


FiO2   


 


Sodium   


 


Potassium   


 


Chloride   


 


Carbon Dioxide   


 


Anion Gap   


 


BUN   


 


Creatinine   


 


Est GFR ( Amer)   


 


Est GFR (Non-Af Amer)   


 


POC Glucose (mg/dL)  145 H  146 H 


 


Random Glucose   


 


Calcium   


 


Phosphorus   


 


Magnesium   


 


Total Bilirubin   


 


AST   


 


ALT   


 


Alkaline Phosphatase   


 


Total Creatine Kinase   


 


Troponin I   


 


Total Protein   


 


Albumin   


 


Globulin   


 


Albumin/Globulin Ratio   


 


Venous Blood Potassium   














  02/16/19





  05:30


 


WBC 


 


RBC 


 


Hgb 


 


Hct 


 


MCV 


 


MCH 


 


MCHC 


 


RDW 


 


Plt Count 


 


MPV 


 


Neut % (Auto) 


 


Lymph % (Auto) 


 


Mono % (Auto) 


 


Eos % (Auto) 


 


Baso % (Auto) 


 


Neut # (Auto) 


 


Lymph # (Auto) 


 


Mono # (Auto) 


 


Eos # (Auto) 


 


Baso # (Auto) 


 


Neutrophils % (Manual) 


 


Band Neutrophils % 


 


Lymphocytes % (Manual) 


 


Monocytes % (Manual) 


 


Platelet Estimate 


 


Large Platelets 


 


Giant Platelets 


 


Poikilocytosis (manual 


 


Anisocytosis (manual) 


 


[place] Cells 


 


Acanthocytes (Spur) 


 


PT 


 


INR 


 


APTT 


 


pO2 


 


VBG pH 


 


VBG pCO2 


 


VBG HCO3 


 


VBG Total CO2 


 


VBG O2 Sat (Calc) 


 


VBG Base Excess 


 


VBG Potassium 


 


Glucose 


 


Lactate 


 


FiO2 


 


Sodium  140


 


Potassium  3.8


 


Chloride  103


 


Carbon Dioxide  26


 


Anion Gap  15


 


BUN  27 H


 


Creatinine  0.8


 


Est GFR ( Amer)  > 60


 


Est GFR (Non-Af Amer)  > 60


 


POC Glucose (mg/dL) 


 


Random Glucose  142 H


 


Calcium  8.7


 


Phosphorus  3.1


 


Magnesium  2.0


 


Total Bilirubin  0.8


 


AST  44


 


ALT  40


 


Alkaline Phosphatase  72


 


Total Creatine Kinase  245 H


 


Troponin I 


 


Total Protein  6.4


 


Albumin  3.6


 


Globulin  2.9


 


Albumin/Globulin Ratio  1.3


 


Venous Blood Potassium 











Radiology Impressions: 


                              Radiology Impressions





Head CT  02/15/19 10:58


IMPRESSION:


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 


width and 10.5 cm length.  4 mm midline shift towards the left as a 


result of mass effect from the subdural collection.  Mildly 


loculated..  No other significant abnormality.


 


 








Ribs X-Ray  02/15/19 11:01


IMPRESSION:


Unremarkable radiographs of the chest and left ribs. No left rib 


fracture. 


 


 











EKG/Cardiology Studies: 


Cardiology / EKG Studies





02/15/19 10:58


ELECTROCARDIOGRAM Stat 


   Comment: 


   Mode Of Transportation: 


   Reason For Exam: needed





02/16/19 09:00


EKG [ELECTROCARDIOGRAM] DAILY 


   Comment: 


   Mode Of Transportation: 


   Reason For Exam: f/u NS ST -T changes











Fingerstick Blood Sugar Results: 142





Critical Care Progress Note





- Nutrition


Nutrition: 


                                    Nutrition











 Category Date Time Status


 


 Diabetic [Consistent Carbohydrate] [DIET] Diets  02/15/19 Lunch Active














Assessment/Plan





- Assessment and Plan (Free Text)


Assessment: 








A/P





SDH s/p fall, DM, HTN, azotemia/dehydration, h/o dementia





- Neuro check


- Neurosurgery follow up


- Continue meds


- Pulmonary toilets

## 2019-02-17 LAB
APTT BLD: 35.9 SECONDS (ref 25.6–37.1)
BUN SERPL-MCNC: 17 MG/DL (ref 9–20)
CALCIUM SERPL-MCNC: 8.8 MG/DL (ref 8.4–10.2)
ERYTHROCYTE [DISTWIDTH] IN BLOOD BY AUTOMATED COUNT: 14.2 % (ref 11.5–14.5)
GFR NON-AFRICAN AMERICAN: > 60
HGB BLD-MCNC: 13.9 G/DL (ref 12–18)
INR PPP: 1
MCH RBC QN AUTO: 32 PG (ref 27–31)
MCHC RBC AUTO-ENTMCNC: 33.7 G/DL (ref 33–37)
MCV RBC AUTO: 95 FL (ref 80–94)
PLATELET # BLD: 187 K/UL (ref 130–400)
PROTHROMBIN TIME: 11.5 SECONDS (ref 9.8–13.1)
RBC # BLD AUTO: 4.34 MIL/UL (ref 4.4–5.9)
WBC # BLD AUTO: 5.2 K/UL (ref 4.8–10.8)

## 2019-02-17 NOTE — [email or web address]
History of Present Illness





- History of Present Illness


History of Present Illness: 





This is a 72 y/o male admitted for head injury. He apparently had a fall at home

and hit his head a but was unable to get up and stayed on the floor for at least

two days. He denies any loss of consciousness.


CT scan of the head showed right frontal convexity subdural hemorrhage.


 He apparently had another episode of fall while in a laundromat. He denies 

dizziness. He regularly uses a cane but was not on it in both instances.


He 





[facility] Hx


HTN


DM 2 on po meds


essential tremors


OA 


depression


hyperlipidemia








Present on Admission





- Present on Admission


Any Indicators Present on Admission: No


History of DVT/PE: No


History of Uncontrolled Diabetes: No


Urinary Catheter: No


Decubitus Ulcer Present: No





Review of Systems





- Neurological


Additional comments: 





essetial tremors





Past Patient History





- Past Social History


Smoking Status: Never Smoked





- CARDIAC


Hx Cardiac Disorders: Yes


Hx Hypertension: Yes





- ENDOCRINE/METABOLIC


Hx Endocrine Disorders: Yes





- MUSCULOSKELETAL/RHEUMATOLOGICAL


Hx Falls: Yes





- PSYCHIATRIC


Hx Substance Use: No





Meds


Allergies/Adverse Reactions: 


                                    Allergies











Allergy/AdvReac Type Severity Reaction Status Date / Time


 


No Known Allergies Allergy   Verified 02/15/19 10:27














Physical Exam





- Head Exam


Head Exam: NORMAL INSPECTION





- Eye Exam


Eye Exam: Normal appearance





- Respiratory Exam


Respiratory Exam: Clear to Auscultation Bilateral





- Cardiovascular Exam


Cardiovascular Exam: REGULAR RHYTHM





- GI/Abdominal Exam


GI & Abdominal Exam: Normal Bowel Sounds





- Neurological Exam


Neurological exam: Alert, Oriented x3


Additional comments: 





essential tremors





Results





- Vital Signs


Recent Vital Signs: 





                                Last Vital Signs











Temp  98.6 F   02/17/19 12:00


 


Pulse  64   02/17/19 12:30


 


Resp  16   02/17/19 12:00


 


BP  121/71   02/17/19 12:30


 


Pulse Ox  97   02/17/19 12:00














- Labs


Result Diagrams: 


                                 02/17/19 05:30





                                 02/17/19 05:30


Labs: 





                         Laboratory Results - last 24 hr











  02/16/19 02/16/19 02/17/19





  16:36 21:03 05:30


 


WBC    5.2


 


RBC    4.34 L


 


Hgb    13.9


 


Hct    41.2


 


MCV    95.0 H


 


MCH    32.0 H


 


MCHC    33.7


 


RDW    14.2


 


Plt Count    187


 


PT   


 


INR   


 


APTT   


 


Sodium   


 


Potassium   


 


Chloride   


 


Carbon Dioxide   


 


Anion Gap   


 


BUN   


 


Creatinine   


 


Est GFR ( Amer)   


 


Est GFR (Non-Af Amer)   


 


POC Glucose (mg/dL)  125 H  140 H 


 


Random Glucose   


 


Calcium   














  02/17/19 02/17/19 02/17/19





  05:30 06:41 11:10


 


WBC   


 


RBC   


 


Hgb   


 


Hct   


 


MCV   


 


MCH   


 


MCHC   


 


RDW   


 


Plt Count   


 


PT   


 


INR   


 


APTT   


 


Sodium  137  


 


Potassium  3.8  


 


Chloride  101  


 


Carbon Dioxide  27  


 


Anion Gap  13  


 


BUN  17  


 


Creatinine  0.7 L  


 


Est GFR ( Amer)  > 60  


 


Est GFR (Non-Af Amer)  > 60  


 


POC Glucose (mg/dL)   170 H  221 H


 


Random Glucose  140 H  


 


Calcium  8.8  














  02/17/19





  12:50


 


WBC 


 


RBC 


 


Hgb 


 


Hct 


 


MCV 


 


MCH 


 


MCHC 


 


RDW 


 


Plt Count 


 


PT  11.5


 


INR  1.0


 


APTT  35.9


 


Sodium 


 


Potassium 


 


Chloride 


 


Carbon Dioxide 


 


Anion Gap 


 


BUN 


 


Creatinine 


 


Est GFR ( Amer) 


 


Est GFR (Non-Af Amer) 


 


POC Glucose (mg/dL) 


 


Random Glucose 


 


Calcium 














Assessment & Plan


(1) Subdural hematoma


Status: Acute   





(2) Hypertension


Status: Acute   





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


Status: Acute   





(4) Fall


Status: Acute   





(5) Depression


Status: Acute   





(6) Essential tremor


Status: Acute   





(7) Osteoarthritis


Status: Acute   





(8) Gait difficulty


Status: Acute   





- Assessment and Plan (Free Text)


Plan: 





Neurosurgical eval


 ICU


 neurocheck


Cont all meds


  IVF

## 2019-02-17 NOTE — CP.CCUPN
CCU Subjective





- Physician Review


Events Since Last Encounter (Free Text): 





Patient awake, no distress, no fever, no vomiting, events reviewed











CCU Objective





- Vital Signs / Intake & Output


Vital Signs (Last 4 hours): 


Vital Signs











  Temp Pulse Resp BP Pulse Ox


 


 02/17/19 06:00   54 L  14  127/76  96


 


 02/17/19 04:00  98.2 F  53 L  12  122/74  100











Intake and Output (Last 8hrs): 


                                 Intake & Output











 02/16/19 02/17/19 02/17/19





 22:59 06:59 14:59


 


Intake Total 1670 150 


 


Output Total 450 500 


 


Balance 1220 -350 


 


Weight  159 lb 2 oz 


 


Intake:   


 


    


 


  Oral 1070 150 


 


Output:   


 


  Urine 450 500 


 


    Urine, Voided 450 500 


 


Other:   


 


  # Bowel Movements 1  














- Physical Exam


Head: Positive for: Normocephalic


Pupils: Positive for: PERRL


Conjunctiva: Positive for: Normal


Ears: Positive for: Normal


Mouth: Positive for: Dry


Pharnyx: Positive for: Normal


Nose (External): Positive for: Atraumatic


Neck: Positive for: Normal Range of Motion


Respiratory/Chest: Positive for: Clear to Auscultation


Cardiovascular: Positive for: Regular Rate and Rhythm


Abdomen: Positive for: Normal Bowel Sounds


Upper Extremity: Positive for: Normal Inspection


Lower Extremity: Positive for: Normal Inspection


Psychiatric: Positive for: Alert





- Medications


Active Medications: 


Active Medications











Generic Name Dose Route Start Last Admin





  Trade Name Freq  PRN Reason Stop Dose Admin


 


Acetaminophen  650 mg  02/15/19 17:20  





  Tylenol 325mg Tab  PO   





  Q6 PRN   





  Headache   





     





     





     


 


Acetaminophen  650 mg  02/16/19 06:22  





  Tylenol 325mg Tab  PO   





  Q4 PRN   





  Other   





     





     





     


 


Atorvastatin Calcium  20 mg  02/16/19 09:00  02/16/19 08:37





  Lipitor  PO   20 mg





  DAILY ARLET   Administration





     





     





     





     


 


Insulin Human Regular  0 units  02/15/19 23:00  02/17/19 06:45





  Humulin R  SC   2 units





  ACCU-CHECK ARLET   Administration





     





     





  Protocol   





     


 


Propranolol HCl  10 mg  02/17/19 09:00  





  Inderal  PO   





  TID ARLET   





     





     





     





     


 


Ramipril  2.5 mg  02/16/19 09:00  02/16/19 08:37





  Altace  PO   2.5 mg





  DAILY ARLET   Administration





     





     





     





     


 


Tramadol HCl  50 mg  02/16/19 06:23  02/16/19 12:41





  Ultram  PO   50 mg





  Q4 PRN   Administration





  Pain, severe (8-10)   





     





     





     














- Patient Studies


Lab Studies: 


                              Microbiology Studies











 02/15/19 17:00 MRSA Culture (Admit) - Final





 Naris    MRSA NOT DETECTED








                                   Lab Studies











  02/17/19 02/17/19 02/17/19 Range/Units





  06:41 05:30 05:30 


 


WBC    5.2  (4.8-10.8)  K/uL


 


RBC    4.34 L  (4.40-5.90)  Mil/uL


 


Hgb    13.9  (12.0-18.0)  g/dL


 


Hct    41.2  (35.0-51.0)  %


 


MCV    95.0 H  (80.0-94.0)  fl


 


MCH    32.0 H  (27.0-31.0)  pg


 


MCHC    33.7  (33.0-37.0)  g/dL


 


RDW    14.2  (11.5-14.5)  %


 


Plt Count    187  (130-400)  K/uL


 


Sodium   137   (132-148)  mmol/l


 


Potassium   3.8   (3.6-5.0)  MMOL/L


 


Chloride   101   ()  mmol/L


 


Carbon Dioxide   27   (22-30)  mmol/L


 


Anion Gap   13   (10-20)  


 


BUN   17   (9-20)  mg/dl


 


Creatinine   0.7 L   (0.8-1.5)  mg/dl


 


Est GFR (African Amer)   > 60   


 


Est GFR (Non-Af Amer)   > 60   


 


POC Glucose (mg/dL)  170 H    ()  mg/dL


 


Random Glucose   140 H   ()  mg/dL


 


Calcium   8.8   (8.4-10.2)  mg/dL














  02/16/19 02/16/19 02/16/19 Range/Units





  21:03 16:36 09:52 


 


WBC     (4.8-10.8)  K/uL


 


RBC     (4.40-5.90)  Mil/uL


 


Hgb     (12.0-18.0)  g/dL


 


Hct     (35.0-51.0)  %


 


MCV     (80.0-94.0)  fl


 


MCH     (27.0-31.0)  pg


 


MCHC     (33.0-37.0)  g/dL


 


RDW     (11.5-14.5)  %


 


Plt Count     (130-400)  K/uL


 


Sodium     (132-148)  mmol/l


 


Potassium     (3.6-5.0)  MMOL/L


 


Chloride     ()  mmol/L


 


Carbon Dioxide     (22-30)  mmol/L


 


Anion Gap     (10-20)  


 


BUN     (9-20)  mg/dl


 


Creatinine     (0.8-1.5)  mg/dl


 


Est GFR (African Amer)     


 


Est GFR (Non-Af Amer)     


 


POC Glucose (mg/dL)  140 H  125 H  227 H  ()  mg/dL


 


Random Glucose     ()  mg/dL


 


Calcium     (8.4-10.2)  mg/dL








                         Laboratory Results - last 24 hr











  02/16/19 02/16/19 02/16/19





  09:52 16:36 21:03


 


WBC   


 


RBC   


 


Hgb   


 


Hct   


 


MCV   


 


MCH   


 


MCHC   


 


RDW   


 


Plt Count   


 


Sodium   


 


Potassium   


 


Chloride   


 


Carbon Dioxide   


 


Anion Gap   


 


BUN   


 


Creatinine   


 


Est GFR ( Amer)   


 


Est GFR (Non-Af Amer)   


 


POC Glucose (mg/dL)  227 H  125 H  140 H


 


Random Glucose   


 


Calcium   














  02/17/19 02/17/19 02/17/19





  05:30 05:30 06:41


 


WBC  5.2  


 


RBC  4.34 L  


 


Hgb  13.9  


 


Hct  41.2  


 


MCV  95.0 H  


 


MCH  32.0 H  


 


MCHC  33.7  


 


RDW  14.2  


 


Plt Count  187  


 


Sodium   137 


 


Potassium   3.8 


 


Chloride   101 


 


Carbon Dioxide   27 


 


Anion Gap   13 


 


BUN   17 


 


Creatinine   0.7 L 


 


Est GFR ( Amer)   > 60 


 


Est GFR (Non-Af Amer)   > 60 


 


POC Glucose (mg/dL)    170 H


 


Random Glucose   140 H 


 


Calcium   8.8 











EKG/Cardiology Studies: 


Cardiology / EKG Studies





02/16/19 09:00


EKG [ELECTROCARDIOGRAM] DAILY 


   Comment: 


   Mode Of Transportation: 


   Reason For Exam: f/u NS ST -T changes











Fingerstick Blood Sugar Results: 170





Critical Care Progress Note





- Nutrition


Nutrition: 


                                    Nutrition











 Category Date Time Status


 


 Consistent Carbohydrate [DIET] Diets  02/16/19 Dinner Active














Assessment/Plan





- Assessment and Plan (Free Text)


Assessment: 








A/P





SDH s/p fall, DM, HTN, azotemia/dehydration, h/o dementia





- Neuro check


- Neurosurgery follow up


- Continue meds


- Pulmonary toilets

## 2019-02-17 NOTE — CP.PCM.PN
Subjective





- Date & Time of Evaluation


Date of Evaluation: 02/16/19


Time of Evaluation: 17:00





- Subjective


Subjective: 





Patient is doing a lot better


 Has no headaches


 Noted increasing  hand tremors


 Has no headaches


 No chest pain or SOB.


Noted elevated FBS.





Objective





- Vital Signs/Intake and Output


Vital Signs (last 24 hours): 


                                        











Temp Pulse Resp BP Pulse Ox


 


 98.6 F   64   16   121/71   97 


 


 02/17/19 12:00  02/17/19 12:30  02/17/19 12:00  02/17/19 12:30  02/17/19 12:00








Intake and Output: 


                                        











 02/17/19 02/17/19





 06:59 18:59


 


Intake Total 500 200


 


Output Total 500 600


 


Balance 0 -400














- Medications


Medications: 


                               Current Medications





Acetaminophen (Tylenol 325mg Tab)  650 mg PO Q6 PRN


   PRN Reason: Headache


Acetaminophen (Tylenol 325mg Tab)  650 mg PO Q4 PRN


   PRN Reason: Other


Atorvastatin Calcium (Lipitor)  20 mg PO DAILY [facility]


   Last Admin: 02/17/19 09:16 Dose:  20 mg


Insulin Human Regular (Humulin R)  0 units SC ACCU-CHECK ARLET; Protocol


   Last Admin: 02/17/19 12:29 Dose:  3 units


Propranolol HCl (Inderal)  10 mg PO TID [facility]


   Last Admin: 02/17/19 12:30 Dose:  10 mg


Ramipril (Altace)  2.5 mg PO DAILY [facility]


   Last Admin: 02/17/19 09:14 Dose:  2.5 mg


Tramadol HCl (Ultram)  50 mg PO Q4 PRN


   PRN Reason: Pain, severe (8-10)


   Last Admin: 02/17/19 09:18 Dose:  50 mg











- Labs


Labs: 


                                        





                                 02/17/19 05:30 





                                 02/17/19 05:30 





                                        











PT  11.5 Seconds (9.8-13.1)   02/17/19  12:50    


 


INR  1.0   02/17/19  12:50    


 


APTT  35.9 Seconds (25.6-37.1)   02/17/19  12:50    














- Head Exam


Head Exam: NORMAL INSPECTION





- Eye Exam


Eye Exam: Normal appearance





- ENT Exam


ENT Exam: Mucous Membranes Moist





- Respiratory Exam


Respiratory Exam: Clear to Ausculation Bilateral





- Cardiovascular Exam


Cardiovascular Exam: REGULAR RHYTHM





- GI/Abdominal Exam


GI & Abdominal Exam: Normal Bowel Sounds





Assessment and Plan


(1) Subdural hematoma


Status: Acute   





(2) Hypertension


Status: Acute   





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


Status: Acute   





(4) Fall


Status: Acute   





(5) Depression


Status: Acute   





(6) Essential tremor


Status: Acute   





(7) Osteoarthritis


Status: Acute   





(8) Gait difficulty


Status: Acute   





- Assessment and Plan (Free Text)


Plan: 





Cont meds


Cont tx


Cont neurocheck


 follow up with neurosurgery


 cardiology eval

## 2019-02-17 NOTE — CP.PCM.PN
Subjective





- Date & Time of Evaluation


Date of Evaluation: 02/17/19


Time of Evaluation: 14:37





- Subjective


Subjective: 





Patient claims that he feels better today


 Started on Propranolol yesterday


Has no headaches


 Tremors have improved.





Objective





- Vital Signs/Intake and Output


Vital Signs (last 24 hours): 


                                        











Temp Pulse Resp BP Pulse Ox


 


 98.6 F   64   16   121/71   97 


 


 02/17/19 12:00  02/17/19 12:30  02/17/19 12:00  02/17/19 12:30  02/17/19 12:00








Intake and Output: 


                                        











 02/17/19 02/17/19





 06:59 18:59


 


Intake Total 500 200


 


Output Total 500 600


 


Balance 0 -400














- Medications


Medications: 


                               Current Medications





Acetaminophen (Tylenol 325mg Tab)  650 mg PO Q6 PRN


   PRN Reason: Headache


Acetaminophen (Tylenol 325mg Tab)  650 mg PO Q4 PRN


   PRN Reason: Other


Atorvastatin Calcium (Lipitor)  20 mg PO DAILY [facility]


   Last Admin: 02/17/19 09:16 Dose:  20 mg


Insulin Human Regular (Humulin R)  0 units SC ACCU-CHECK ARLET; Protocol


   Last Admin: 02/17/19 12:29 Dose:  3 units


Propranolol HCl (Inderal)  10 mg PO TID [facility]


   Last Admin: 02/17/19 12:30 Dose:  10 mg


Ramipril (Altace)  2.5 mg PO DAILY [facility]


   Last Admin: 02/17/19 09:14 Dose:  2.5 mg


Tramadol HCl (Ultram)  50 mg PO Q4 PRN


   PRN Reason: Pain, severe (8-10)


   Last Admin: 02/17/19 09:18 Dose:  50 mg











- Labs


Labs: 


                                        





                                 02/17/19 05:30 





                                 02/17/19 05:30 





                                        











PT  11.5 Seconds (9.8-13.1)   02/17/19  12:50    


 


INR  1.0   02/17/19  12:50    


 


APTT  35.9 Seconds (25.6-37.1)   02/17/19  12:50    














- Head Exam


Head Exam: NORMAL INSPECTION





- Eye Exam


Eye Exam: Normal appearance





- Respiratory Exam


Respiratory Exam: Clear to Ausculation Bilateral





- Cardiovascular Exam


Cardiovascular Exam: REGULAR RHYTHM





- GI/Abdominal Exam


GI & Abdominal Exam: Normal Bowel Sounds





Assessment and Plan


(1) Subdural hematoma


Status: Acute   





(2) Hypertension


Status: Acute   





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


Status: Acute   





(4) Fall


Status: Acute   





(5) Depression


Status: Acute   





(6) Essential tremor


Status: Acute   





(7) Osteoarthritis


Status: Acute   





(8) Gait difficulty


Status: Acute   





- Assessment and Plan (Free Text)


Plan: 





Cont meds


 Con ttx


Cont PT


  cardiology eval

## 2019-02-18 LAB
APTT BLD: 38.9 SECONDS (ref 25.6–37.1)
BUN SERPL-MCNC: 16 MG/DL (ref 9–20)
CALCIUM SERPL-MCNC: 9.1 MG/DL (ref 8.4–10.2)
ERYTHROCYTE [DISTWIDTH] IN BLOOD BY AUTOMATED COUNT: 14.2 % (ref 11.5–14.5)
GFR NON-AFRICAN AMERICAN: > 60
HGB BLD-MCNC: 14 G/DL (ref 12–18)
INR PPP: 1
MCH RBC QN AUTO: 31.4 PG (ref 27–31)
MCHC RBC AUTO-ENTMCNC: 33.3 G/DL (ref 33–37)
MCV RBC AUTO: 94.2 FL (ref 80–94)
PLATELET # BLD: 200 K/UL (ref 130–400)
PROTHROMBIN TIME: 11.6 SECONDS (ref 9.8–13.1)
RBC # BLD AUTO: 4.45 MIL/UL (ref 4.4–5.9)
WBC # BLD AUTO: 4.8 K/UL (ref 4.8–10.8)

## 2019-02-18 PROCEDURE — 00C40ZZ EXTIRPATION OF MATTER FROM INTRACRANIAL SUBDURAL SPACE, OPEN APPROACH: ICD-10-PCS

## 2019-02-18 PROCEDURE — 009400Z DRAINAGE OF INTRACRANIAL SUBDURAL SPACE WITH DRAINAGE DEVICE, OPEN APPROACH: ICD-10-PCS

## 2019-02-18 NOTE — CP.PCM.CON
History of Present Illness





- History of Present Illness


History of Present Illness: 





I was asked to see patient by Dr Katz,


Patient seen at 7:02





Patient is a 73 year old male with HTN, DM who presents with a fall.  The 

patient generally walks wit a cane, but by report did not have his cane.  He was

found to have a subdural hematoma.  He requires craniotomy.  He denies chest 

pain or dyspnea





Review of Systems





- Constitutional


Constitutional: absent: As Per HPI, Anorexia, Chills, Daytime Sleepiness, 

Excessive Sweating, Fatigue, Fever, Frequent Falls, Headache, Increased 

Appetite, Lethargy, Malaise, Night Sweats, Snoring, Sleep Apnea, Weight Gain, 

Weight Loss, Weakness, Other





- EENT


Eyes: absent: As Per HPI, Blind Spots, Blurred Vision, Change in Vision, 

Decreased Night Vision, Diplopia, Discharge, Dry Eye, Exophthalmos, Floaters, 

Irritation, Itchy Eyes, Loss of Peripheral Vision, Pain, Photophobia, Requires 

Corrective Lenses, Sees Flashes, Spots in Vision, Tunnel Vision, Other Visual 

Disturbances, Loss of Vision, Other


Ears: absent: As Per HPI, Decreased Hearing, Ear Discharge, Ear Pain, Tinnitus, 

Abnormal Hearing, Disequilibrium, Dizziness, Other


Nose/Mouth/Throat: absent: As Per HPI, Epistaxis, Nasal Congestion, Nasal 

Discharge, Nasal Obstruction, Nasal Trauma, Nose Pain, Post Nasal Drip, Sinus 

Pain, Sinus Pressure, Bleeding Gums, Change in Voice, Dental Pain, Dry Mouth, 

Dysphagia, Halitosis, Hoarsness, Lip Swelling, Mouth Lesions, Mouth Pain, 

Odynophagia, Sore Throat, Throat Swelling, Tongue Swelling, Facial Pain, Neck 

Pain, Neck Mass, Other





- Cardiovascular


Cardiovascular: absent: As Per HPI, Acrocyanosis, Chest Pain, Chest Pain at 

Rest, Chest Pain with Activity, Claudication, Diaphoresis, Dyspnea, Dyspnea on 

Exertion, Edema, Irregular Heart Rhythm, Pain Radiating to Arm/Neck/Jaw, Leg 

Edema, Leg Ulcers, Lightheadedness, Orthopnea, Palpitations, Paroxysmal 

Nocturnal Dyspnea, Pedal Edema, Radiating Pain, Rapid Heart Rate, Slow Heart 

Rate, Syncope, Other





- Respiratory


Respiratory: absent: As Per HPI, Cough, Dyspnea, Hemoptysis, Dyspnea on 

Exertion, Wheezing, Snoring, Stridor, Pain on Inspiration, Chest Congestion, 

Excessive Mucous Production, Change in Mucous Color, Pain with Coughing, Other





- Gastrointestinal


Gastrointestinal: absent: As Per HPI, Abdominal Pain, Belching, Bloating, Change

in Bowel Habits, Change in Stool Character, Coffee Ground Emesis, Constipation, 

Cramping, Diarrhea, Dyspepsia, Dysphagia, Early Satiety, Excessive Flatus, Fecal

Incontinence, Heartburn, Hematemesis, Hematochezia, Loose Stools, Melena, 

Nausea, Odynophagia, Temesmus, Vomiting, Other





- Genitourinary


Genitourinary: absent: As Per HPI, Change in Urinary Stream, Difficulty Urinati

ng, Dysuria, Flank Pain, Hematuria, Pyuria, Nocturia, Urinary Incontinence, 

Urinary Frequency, Urinary Hesitance, Urinary Urgency, Voiding Freq/Small Amts, 

Freq UTI, Hx Renal/Bladder Calculi, Hx /Renal Surgery, Bladder Distension, 

Other





- Musculoskeletal


Musculoskeletal: absent: As Per HPI, Abnormal Gait, Arthralgias, Atrophy, Back 

Pain, Deformity, Joint Swelling, Limited Range of Motion, Loss of Height, Muscle

Cramps, Muscle Weakness, Myalgias, Neck Pain, Numbness, Radiating Pain into 

Limb, Stiffness, Tingling, Other





- Integumentary


Integumentary: absent: As Per HPI, Acne, Alopecia, Bleeding Lesions, Change in H

air, Change in Nails, Change in Pigmentation, Changing Lesions, Dry Skin, 

Erythema, Furuncle, Hirsutism, Lesions, New Lesions, Non-Healing Lesions, 

Photosensitivity, Pruritus, Rash, Skin Pain, Skin Ulcer, Sores, Striae, 

Swelling, Unusual Bruising, Wounds, Jaundice, Other





- Neurological


Neurological: absent: As Per HPI, Abnormal Gait, Abnormal Hearing, Abnormal 

Movements, Abnormal Speech, Behavioral Changes, Burning Sensations, Confusion, 

Convulsions, Disequilibrium, Dizziness, Numbness, Focal Weakness, Frequent 

Falls, Headaches, Lack of Coordination, Loss of Vision, Memory Loss, 

Paresthesias, Radicular Pain, Restless Legs, Sensory Deficit, Syncope, Tingling,

Tremor, Vertigo, Weakness, Other Visual Disturbances, Other





- Psychiatric


Psychiatric: absent: As Per HPI, Abnormal Sleep Pattern, Anhedonia, Anxiety, 

Auditory Hallucinations, Behavioral Changes, Change in Appetite, Change in 

Libido, Confusion, Depression, Difficulty Concentrating, Hallucinations, 

Homicidal Ideation, Hopelessness, Irritability, Memory Loss, Mood Swings, Panic 

Attacks, Paranoia, Suicidal Ideation, Visual Hallucinations, Tactile Halluci

nations, Other





- Endocrine


Endocrine: absent: As Per HPI, Change in Body Appearance, Change in Libido, Cold

Intolorance, Deepening of Voice, Excessive Sweating, Fatigue, Flushing, Heat 

Intolorance, Increase in Ring/Shoe/Hat Size, Palpitations, Polydipsia, Polyp

hagia, Polyuria, Other





- Hematologic/Lymphatic


Hematologic: absent: As Per HPI, Easy Bleeding, Easy Bruising, Lymphadenopathy, 

Other





Past Patient History





- Past Social History


Smoking Status: Never Smoked





- CARDIAC


Hx Cardiac Disorders: Yes


Hx Hypertension: Yes





- ENDOCRINE/METABOLIC


Hx Endocrine Disorders: Yes





- MUSCULOSKELETAL/RHEUMATOLOGICAL


Hx Falls: Yes





- PSYCHIATRIC


Hx Substance Use: No





Meds


Allergies/Adverse Reactions: 


                                    Allergies











Allergy/AdvReac Type Severity Reaction Status Date / Time


 


No Known Allergies Allergy   Verified 02/15/19 10:27














- Medications


Medications: 


                               Current Medications





Acetaminophen (Tylenol 325mg Tab)  650 mg PO Q6 PRN


   PRN Reason: Headache


Acetaminophen (Tylenol 325mg Tab)  650 mg PO Q4 PRN


   PRN Reason: Other


Atorvastatin Calcium (Lipitor)  20 mg PO DAILY [facility]


   Last Admin: 02/17/19 09:16 Dose:  20 mg


Insulin Human Regular (Humulin R)  0 units SC ACCU-CHECK [facility]; Protocol


   Last Admin: 02/17/19 23:00 Dose:  Not Given


Propranolol HCl (Inderal)  10 mg PO TID [facility]


   Last Admin: 02/17/19 16:59 Dose:  Not Given


Ramipril (Altace)  2.5 mg PO DAILY [facility]


   Last Admin: 02/17/19 09:14 Dose:  2.5 mg


Tramadol HCl (Ultram)  50 mg PO Q4 PRN


   PRN Reason: Pain, severe (8-10)


   Last Admin: 02/17/19 17:02 Dose:  50 mg











Physical Exam





- Constitutional


Appears: Non-toxic





- Head Exam


Head Exam: NORMAL INSPECTION





- Eye Exam


Eye Exam: Normal appearance





- ENT Exam


ENT Exam: Mucous Membranes Moist





- Neck Exam


Neck exam: Positive for: Full Rom





- Respiratory Exam


Respiratory Exam: NORMAL BREATHING PATTERN





- Cardiovascular Exam


Cardiovascular Exam: REGULAR RHYTHM





- GI/Abdominal Exam


GI & Abdominal Exam: Normal Bowel Sounds





- Rectal Exam


Rectal Exam: Deferred





- Extremities Exam


Extremities exam: Negative for: pedal edema





- Back Exam


Back exam: NORMAL INSPECTION





- Neurological Exam


Neurological exam: Alert, Oriented x3





- Psychiatric Exam


Psychiatric exam: Normal Affect





- Skin


Skin Exam: Normal Color





Results





- Vital Signs


Recent Vital Signs: 


                                Last Vital Signs











Temp  98.4 F   02/18/19 04:00


 


Pulse  56 L  02/18/19 06:00


 


Resp  25 H  02/18/19 06:00


 


BP  109/68   02/18/19 06:00


 


Pulse Ox  97   02/18/19 06:00














- Labs


Result Diagrams: 


                                 02/18/19 06:00





                                 02/18/19 06:00


Labs: 


                         Laboratory Results - last 24 hr











  02/17/19 02/17/19 02/17/19





  05:30 11:10 12:50


 


WBC   


 


RBC   


 


Hgb   


 


Hct   


 


MCV   


 


MCH   


 


MCHC   


 


RDW   


 


Plt Count   


 


PT    11.5


 


INR    1.0


 


APTT    35.9


 


Sodium   


 


Potassium   


 


Chloride   


 


Carbon Dioxide   


 


Anion Gap   


 


BUN   


 


Creatinine   


 


Est GFR ( Amer)   


 


Est GFR (Non-Af Amer)   


 


POC Glucose (mg/dL)   221 H 


 


Random Glucose   


 


Calcium   


 


Blood Type   


 


Blood Type Confirm  A POSITIVE  


 


Antibody Screen   


 


BBK History Checked   














  02/17/19 02/17/19 02/17/19





  16:02 18:30 21:05


 


WBC   


 


RBC   


 


Hgb   


 


Hct   


 


MCV   


 


MCH   


 


MCHC   


 


RDW   


 


Plt Count   


 


PT   


 


INR   


 


APTT   


 


Sodium   


 


Potassium   


 


Chloride   


 


Carbon Dioxide   


 


Anion Gap   


 


BUN   


 


Creatinine   


 


Est GFR ( Amer)   


 


Est GFR (Non-Af Amer)   


 


POC Glucose (mg/dL)  109   190 H


 


Random Glucose   


 


Calcium   


 


Blood Type   A POSITIVE 


 


Blood Type Confirm   


 


Antibody Screen   Negative 


 


BBK History Checked   No verified bt 














  02/18/19 02/18/19 02/18/19





  06:00 06:00 06:00


 


WBC  4.8  


 


RBC  4.45  


 


Hgb  14.0  


 


Hct  42.0  


 


MCV  94.2 H  


 


MCH  31.4 H  


 


MCHC  33.3  


 


RDW  14.2  


 


Plt Count  200  


 


PT   11.6 


 


INR   1.0 


 


APTT   38.9 H 


 


Sodium    137


 


Potassium    4.0


 


Chloride    97 L


 


Carbon Dioxide    30


 


Anion Gap    14


 


BUN    16


 


Creatinine    0.7 L


 


Est GFR ( Amer)    > 60


 


Est GFR (Non-Af Amer)    > 60


 


POC Glucose (mg/dL)   


 


Random Glucose    139 H


 


Calcium    9.1


 


Blood Type   


 


Blood Type Confirm   


 


Antibody Screen   


 


BBK History Checked   














  02/18/19





  06:55


 


WBC 


 


RBC 


 


Hgb 


 


Hct 


 


MCV 


 


MCH 


 


MCHC 


 


RDW 


 


Plt Count 


 


PT 


 


INR 


 


APTT 


 


Sodium 


 


Potassium 


 


Chloride 


 


Carbon Dioxide 


 


Anion Gap 


 


BUN 


 


Creatinine 


 


Est GFR ( Amer) 


 


Est GFR (Non-Af Amer) 


 


POC Glucose (mg/dL)  139 H


 


Random Glucose 


 


Calcium 


 


Blood Type 


 


Blood Type Confirm 


 


Antibody Screen 


 


BBK History Checked 














- EKG Data


EKG Interpreted by: Myself


EKG shows normal: Sinus rhythm





Assessment & Plan


(1) Subdural hematoma


Assessment and Plan: 


patient is scheduled for carniotomy.  Although EKG suggests possible inferior 

infarction the patient is asymptomatic and hemodynamically stable from a cardaic

standpoint.  He will benefit from continued betablocker therapy.  He is 

optimized for the planned surgery.  will hold aspirin given subdural hematoma


Status: Acute   





(2) Diabetes mellitus type 2 in nonobese


Assessment and Plan: 


risk factor modificaiton


Status: Acute   





(3) Hypertension


Assessment and Plan: 


well controlled


Status: Acute

## 2019-02-18 NOTE — PN
DATE:  02/18/2019



CRITICAL CARE PROGRESS NOTE.



LOCATION:  The patient in ICU, bed 424.



TIME SPENT:  Thirty-five minutes.



The patient is seen and evaluated at the bedside.  Case was discussed in

multidisciplinary ICU rounds this morning.



Past medical, surgical, family, and social history reviewed, as noted in H

and P.



SUBJECTIVE:  A 73-year-old male with history significant for hypertension,

diabetes mellitus type 2, essential tremors, osteoarthritis, depression,

and hyperlipidemia, able to ambulate at home with help of a cane, fell at

home and sustained subdural hematoma, admitted with subdural hematoma,

overnight normotensive, afebrile.  No seizure episodes, seen by

Neurosurgery consult, awaiting for craniotomy and evacuation of subdural

hematoma, remains alert and awake, follows commands appropriate.  No

headache, shortness of breath, chest pain, or palpitation.



PHYSICAL EXAMINATION:

VITAL SIGNS:  Temperature 97.4, heart rate 60 regular, blood pressure

127/60, mean artery pressure 86, respiratory rate 18, and saturation 97% on

oxygen supplement 2 L nasal cannula, intake 450, output 1200, and negative

balance 750, weight 156 pounds.

HEAD, EYES, EAR, NOSE, AND THROAT:  Pupils are 2-3 mm, reactive, no

nystagmus.

NECK:  Supple, trachea is central.

CHEST:  Bilateral breath sounds, clear to auscultation.

HEART:  Rhythm regular, S1 and S2 normal intensity.  No S3 or S4 gallop. 

No audible murmur.

ABDOMEN:  Bowel sounds present, soft.  Liver and spleen not palpable. 

Bladder not distended.

EXTREMITIES:  Without clubbing, cyanosis, or edema.

NEUROLOGIC:  Cranial nerves intact.  No motor or sensory impairment.

SKIN:  Without any breakdown.



CURRENT MEDICATIONS:  Tylenol 650 mg every 6 hours p.r.n., Lipitor 20 mg

p.o. daily, Accu-Check with regular insulin coverage, propranolol 10 mg

p.o. 3 times daily, Altace 2.5 mg p.o. daily, and Ultram 50 mg p.o. every 4

hours p.r.n., on hold.



LABORATORY DATA:  WBC 4.8, hemoglobin 14, hematocrit 42, platelet count

200.  PT 11.6, INR 1, PTT 38.9.  SMA-7:  Sodium 147, potassium 4, chloride

97, CO2 of 30, blood, urea, and nitrogen 16, creatinine 0.7, random glucose

139, and calcium 9.1.  Nasal smear MRSA negative.  Electrocardiogram,

normal sinus rhythm, normal electrical axis, no ST-T changes.  X-ray of the

ribs, unremarkable.  Radiographs for the chest and left ribs with no rib

fracture.  CT head done on 02/15/2019 showed acute-on-chronic right frontal

convexity, subdural hemorrhage measuring 3.1 cm width and 10.5 cm length, 4

mm midline shift towards the left, mildly loculated.  No other significant

abnormality.



IMPRESSION:

NEUROLOGIC:  Acute-on-chronic right frontal subdural hemorrhage with

minimal midline shift, status post recurrent falls at home, essential

tremor, remains oriented to name, place, and time.



PULMONARY:  Chest x-ray, no acute infiltrate, no rib fracture.



CARDIAC:  History of hypertension, currently controlled.  No cardiac

arrhythmias noted in telemetry.



GASTROINTESTINAL:  No acute issues noted.



RENAL:  With normal renal function.



HEMATOLOGY:  No leukocytosis.  Hemoglobin is 14, normal platelet count.



PLAN:  Keep head of bed at 30 degrees up, hold anticoagulation for

prophylaxis secondary to subdural hematoma, analgesics as needed for pain. 

Follow the operative and postoperative course.







__________________________________________

Marco Antonio Christopher MD





DD:  02/18/2019 12:07:21

DT:  02/18/2019 14:03:38

Job # [id number]

## 2019-02-18 NOTE — CP.PCM.PN
Subjective





- Date & Time of Evaluation


Date of Evaluation: 02/18/19


Time of Evaluation: 10:00





- Subjective


Subjective: 





patient seen and examined at bedside.


Interim events noted


No complaints offered at this time


denies cp/sob/fever/chills.


available diagnostic data reviewed





Review of Systems All systems: reviewed and no additional remarkable complaints 

except mentioned above





Objective





Vital Signs Stable


- Constitutional


Appears: Non-toxic, No Acute Distress





Head Exam: NORMAL INSPECTION





Eye Exam: Normal appearance





Respiratory Exam: NORMAL BREATHING PATTERN





Cardiovascular Exam: +S1, +S2





GI & Abdominal Exam: Soft





Neurological Exam: Alert, Awake





Psychiatric exam: Normal Affect, Normal Mood





Skin Exam: Normal Color, Warm





Assessment and Plan





monitor vitals


monitor labs


Cont meds


Cont tx


consultants appreciated input


For OR today


rest of plan as ordered








Assessment and Plan


(1) Subdural hematoma


Status: Acute

## 2019-02-18 NOTE — CARD
--------------- APPROVED REPORT --------------





Date of service: 02/18/2019



EKG Measurement

Heart [id number]

TN [id number]

[id number]

[id number]

[id number]



<Conclusion>

Normal sinus rhythm

Normal ECG

## 2019-02-19 LAB
BUN SERPL-MCNC: 14 MG/DL (ref 9–20)
CALCIUM SERPL-MCNC: 9 MG/DL (ref 8.4–10.2)
ERYTHROCYTE [DISTWIDTH] IN BLOOD BY AUTOMATED COUNT: 14.1 % (ref 11.5–14.5)
GFR NON-AFRICAN AMERICAN: > 60
HGB BLD-MCNC: 14.4 G/DL (ref 12–18)
MCH RBC QN AUTO: 31.6 PG (ref 27–31)
MCHC RBC AUTO-ENTMCNC: 33.6 G/DL (ref 33–37)
MCV RBC AUTO: 94.2 FL (ref 80–94)
PLATELET # BLD: 213 K/UL (ref 130–400)
RBC # BLD AUTO: 4.56 MIL/UL (ref 4.4–5.9)
WBC # BLD AUTO: 6.9 K/UL (ref 4.8–10.8)

## 2019-02-19 RX ADMIN — POLYETHYLENE GLYCOL 3350 SCH GM: 17 POWDER, FOR SOLUTION ORAL at 21:05

## 2019-02-19 NOTE — PQF
PROVIDER RESPONSE TEXT:



Subdural hematoma



REVIEWER QUERY TEXT:



Clarification of Clinical Diagnostic Findings



Please clarify if there is an associated diagnosis to go along with the documentation of a mid line s
hift



OR: Disagree

OR: Other explanation of clinical finding



2/15 Head CT: Acute on chronic right frontal convexity subdural hemorrhage, 3.1 cm  width and 10.5 cm
 length.4 mm midline shift towards the left as a  result of mass effect from the subdural collection.
Mildly  loculated.No other significant abnormality



2/15 Critical Care note: Subjective: (Free Text)Preliminary CT Brain shows a R-sided SDH with mild mi
dline shift.

LABS: -R SDH with elements of chronicity, with small midline shift.

IMPRESSION / MAJOR PROBLEMS include:

1.S/p Fall at Home

2.R SDH with elements of chronicity, with small midline shift.

Plan: NeuroSurg eval, Neurochecks, HOB elevation, SCDs, keep SBP no higher than 130-140, maintain

normogylcemia, IVF hydration.Repeat Brain imaging as per Neurology / NeuroSurg, or with any acute

deterioration evident.



2/16 Critical Care note: 4 mm midline shift towards the left as a result of mass effect from the subd
ural collection













The patient's Clinical Indicators include:

---





Query created by: Yazmin Amado on 2/18/2019 1:58 PM





Electronically signed by:  Harsha Katz MD 2/19/2019 10:28 PM

## 2019-02-19 NOTE — PQF
PROVIDER RESPONSE TEXT:



Subdural hematoma



REVIEWER QUERY TEXT:



Clarification of Clinical Diagnostic Findings



Please clarify if there is an associated diagnosis to go along with the documentation of a mid line s
hift



OR: Disagree

OR: Other explanation of clinical finding



2/15 Head CT: Acute on chronic right frontal convexity subdural hemorrhage, 3.1 cm  width and 10.5 cm
 length.4 mm midline shift towards the left as a  result of mass effect from the subdural collection.
Mildly  loculated.No other significant abnormality



2/15 Critical Care note: Subjective: (Free Text)Preliminary CT Brain shows a R-sided SDH with mild mi
dline shift.

LABS: -R SDH with elements of chronicity, with small midline shift.

IMPRESSION / MAJOR PROBLEMS include:

1.S/p Fall at Home

2.R SDH with elements of chronicity, with small midline shift.

Plan: NeuroSurg eval, Neurochecks, HOB elevation, SCDs, keep SBP no higher than 130-140, maintain

normogylcemia, IVF hydration.Repeat Brain imaging as per Neurology / NeuroSurg, or with any acute

deterioration evident.



2/16 Critical Care note: 4 mm midline shift towards the left as a result of mass effect from the subd
ural collection













The patient's Clinical Indicators include:

---





Query created by: Yazmin Amado on 2/19/2019 11:44 AM





Electronically signed by:  Vijay Murillo 2/19/2019 10:02 PM

## 2019-02-19 NOTE — CP.CCUPN
CCU Subjective





- Physician Review


Subjective (Free Text): 





02/19/19 12:11


The patient was Seen/interviewed and examined by me at the bedside during ICU 

round, 


Medical records reviewed and Management issues were discussed and formulated 

with the house staff.


Events reviewed 





73 Years old Male with PMHx of HTN, DM II, Dementia and frequent fells at home


Who was Admitted with Left sided mid-chest/ rib pain, CT Brain shows a R-sided 

SDH with mild midline shift


Underdwent carniotomy 02/18


Doing well today


Awake, Comfortable, NAD


Denies any chest pain, SOB or Palpitations


Afebrile, NSR on the monitor





Pain controlled on PRN Tylenol and Morphine


Continue frequent neuro-check


Scheduled for repeat brain CT in AM

















CCU Objective





- Vital Signs / Intake & Output


Vital Signs (Last 4 hours): 


Vital Signs











  Temp Pulse Resp BP Pulse Ox


 


 02/19/19 08:22   67   134/63 


 


 02/19/19 08:00  98.8 F  64  18  134/63  96


 


 02/19/19 07:00   61  15  127/66  96


 


 02/19/19 06:00   67  21  133/71  96











Intake and Output (Last 8hrs): 


                                 Intake & Output











 02/18/19 02/19/19 02/19/19





 22:59 06:59 14:59


 


Intake Total 320 0 


 


Output Total 200  200


 


Balance 120 0 -200


 


Weight  158 lb 


 


Intake:   


 


   0 


 


  Oral 120  


 


Output:   


 


  Urine 200  200


 


    Urine, Voided 200  200


 


Other:   


 


  # Voids   


 


    Urine, Voided 1 1 














- Physical Exam


Head: Positive for: Normocephalic


Pupils: Positive for: PERRL


Conjunctiva: Positive for: Normal


Ears: Positive for: Normal


Mouth: Positive for: Dry


Pharnyx: Positive for: Normal


Nose (External): Positive for: Atraumatic


Neck: Positive for: Normal Range of Motion


Respiratory/Chest: Positive for: Clear to Auscultation


Cardiovascular: Positive for: Regular Rate and Rhythm


Abdomen: Positive for: Normal Bowel Sounds


Upper Extremity: Positive for: Normal Inspection


Lower Extremity: Positive for: Normal Inspection


Psychiatric: Positive for: Alert





- Medications


Active Medications: 


Active Medications











Generic Name Dose Route Start Last Admin





  Trade Name Freq  PRN Reason Stop Dose Admin


 


Acetaminophen  650 mg  02/15/19 17:20  





  Tylenol 325mg Tab  PO   





  Q6 PRN   





  Headache   





     





     





     


 


Acetaminophen  650 mg  02/16/19 06:22  02/19/19 08:25





  Tylenol 325mg Tab  PO   650 mg





  Q4 PRN   Administration





  Other   





     





     





     


 


Atorvastatin Calcium  20 mg  02/16/19 09:00  02/19/19 08:22





  Lipitor  PO   20 mg





  DAILY ARLET   Administration





     





     





     





     


 


Insulin Human Regular  0 units  02/15/19 23:00  02/19/19 08:20





  Humulin R  SC   2 units





  ACCU-CHECK ARLET   Administration





     





     





  Protocol   





     


 


Morphine Sulfate  2 mg  02/18/19 14:16  02/18/19 19:48





  Morphine  IVP   2 mg





  Q4 PRN   Administration





  Pain, severe (8-10)   





     





     





     


 


Polyethylene Glycol  17 gm  02/19/19 22:00  





  Miralax  PO   





  HS ARLET   





     





     





     





     


 


Propranolol HCl  10 mg  02/17/19 09:00  02/19/19 08:22





  Inderal  PO   10 mg





  TID ARLET   Administration





     





     





     





     


 


Ramipril  2.5 mg  02/16/19 09:00  02/19/19 08:22





  Altace  PO   2.5 mg





  DAILY ARLET   Administration





     





     





     





     














- Patient Studies


Lab Studies: 


                                   Lab Studies











  02/19/19 02/19/19 02/19/19 Range/Units





  05:19 04:45 04:45 


 


WBC    6.9  (4.8-10.8)  K/uL


 


RBC    4.56  (4.40-5.90)  Mil/uL


 


Hgb    14.4  (12.0-18.0)  g/dL


 


Hct    43.0  (35.0-51.0)  %


 


MCV    94.2 H  (80.0-94.0)  fl


 


MCH    31.6 H  (27.0-31.0)  pg


 


MCHC    33.6  (33.0-37.0)  g/dL


 


RDW    14.1  (11.5-14.5)  %


 


Plt Count    213  (130-400)  K/uL


 


Sodium   133   (132-148)  mmol/l


 


Potassium   4.0   (3.6-5.0)  MMOL/L


 


Chloride   93 L   ()  mmol/L


 


Carbon Dioxide   29   (22-30)  mmol/L


 


Anion Gap   15   (10-20)  


 


BUN   14   (9-20)  mg/dl


 


Creatinine   0.8   (0.8-1.5)  mg/dl


 


Est GFR (African Amer)   > 60   


 


Est GFR (Non-Af Amer)   > 60   


 


POC Glucose (mg/dL)  173 H    ()  mg/dL


 


Random Glucose   159 H   ()  mg/dL


 


Calcium   9.0   (8.4-10.2)  mg/dL














  02/18/19 02/18/19 02/18/19 Range/Units





  21:06 16:12 12:41 


 


WBC     (4.8-10.8)  K/uL


 


RBC     (4.40-5.90)  Mil/uL


 


Hgb     (12.0-18.0)  g/dL


 


Hct     (35.0-51.0)  %


 


MCV     (80.0-94.0)  fl


 


MCH     (27.0-31.0)  pg


 


MCHC     (33.0-37.0)  g/dL


 


RDW     (11.5-14.5)  %


 


Plt Count     (130-400)  K/uL


 


Sodium     (132-148)  mmol/l


 


Potassium     (3.6-5.0)  MMOL/L


 


Chloride     ()  mmol/L


 


Carbon Dioxide     (22-30)  mmol/L


 


Anion Gap     (10-20)  


 


BUN     (9-20)  mg/dl


 


Creatinine     (0.8-1.5)  mg/dl


 


Est GFR (African Amer)     


 


Est GFR (Non-Af Amer)     


 


POC Glucose (mg/dL)  171 H  146 H  140 H  ()  mg/dL


 


Random Glucose     ()  mg/dL


 


Calcium     (8.4-10.2)  mg/dL








                         Laboratory Results - last 24 hr











  02/18/19 02/18/19 02/18/19





  12:41 16:12 21:06


 


WBC   


 


RBC   


 


Hgb   


 


Hct   


 


MCV   


 


MCH   


 


MCHC   


 


RDW   


 


Plt Count   


 


Sodium   


 


Potassium   


 


Chloride   


 


Carbon Dioxide   


 


Anion Gap   


 


BUN   


 


Creatinine   


 


Est GFR ( Amer)   


 


Est GFR (Non-Af Amer)   


 


POC Glucose (mg/dL)  140 H  146 H  171 H


 


Random Glucose   


 


Calcium   














  02/19/19 02/19/19 02/19/19





  04:45 04:45 05:19


 


WBC  6.9  


 


RBC  4.56  


 


Hgb  14.4  


 


Hct  43.0  


 


MCV  94.2 H  


 


MCH  31.6 H  


 


MCHC  33.6  


 


RDW  14.1  


 


Plt Count  213  


 


Sodium   133 


 


Potassium   4.0 


 


Chloride   93 L 


 


Carbon Dioxide   29 


 


Anion Gap   15 


 


BUN   14 


 


Creatinine   0.8 


 


Est GFR ( Amer)   > 60 


 


Est GFR (Non-Af Amer)   > 60 


 


POC Glucose (mg/dL)    173 H


 


Random Glucose   159 H 


 


Calcium   9.0 











Fingerstick Blood Sugar Results: 173





Review of Systems





- Constitutional


Constitutional: UN





- Respiratory


Respiratory: absent: As Per HPI, Cough, Dyspnea, Hemoptysis, Dyspnea on 

Exertion, Wheezing, Snoring, Stridor, Pain on Inspiration, Chest Congestion, 

Excessive Mucous Production, Change in Mucous Color, Pain with Coughing, Other, 

UNREMARKABLE





- Gastrointestinal


Gastrointestinal: absent: As Per HPI, Abdominal Pain, Belching, Bloating, Change

in Bowel Habits, Change in Stool Character, Coffee Ground Emesis, Constipation, 

Cramping, Diarrhea, Dyspepsia, Dysphagia, Early Satiety, Excessive Flatus, Fecal

Incontinence, Heartburn, Hematemesis, Hematochezia, Loose Stools, Melena, 

Nausea, Odynophagia, Temesmus, Vomiting, Other, UNREMARKABLE





- Neurological


Neurological: Abnormal Gait, Disequilibrium.  absent: Abnormal Hearing, Abnormal

Movements, Convulsions, Dizziness, Focal Weakness





Critical Care Progress Note





- Extremities/Vascular


Does the Patient have a Central Venous Catheter?: No


Does the Patient need a Central Venous Catheter?: No


Does the Patient have a Farris Catheter?: No


Does the Patient need a Farris Catheter?: No





- Nutrition


Nutrition: 


                                    Nutrition











 Category Date Time Status


 


 Heart Healthy Diet [DIET] Diets  02/18/19 Dinner Active














Assessment/Plan


(1) Subdural hematoma


Current Visit: Yes   Status: Acute   Priority: High   





(2) Diabetes mellitus type 2 in nonobese


Current Visit: Yes   Status: Acute   Priority: Medium   





(3) Hypertension


Current Visit: Yes   Status: Acute   Priority: Medium   





(4) Gait difficulty


Current Visit: Yes   Status: Acute   Priority: Medium

## 2019-02-19 NOTE — CP.PCM.PN
Subjective





- Date & Time of Evaluation


Date of Evaluation: 02/19/19


Time of Evaluation: 10:22





- Subjective


Subjective: 





pod1


a a ox3


JUAN PABLO


EOM full


good st


tremulous in l arm but no real drift


CTin AM





Objective





- Vital Signs/Intake and Output


Vital Signs (last 24 hours): 


                                        











Temp Pulse Resp BP Pulse Ox


 


 98.8 F   63   15   124/61   95 


 


 02/19/19 08:00  02/19/19 09:00  02/19/19 09:00  02/19/19 09:00  02/19/19 09:00








Intake and Output: 


                                        











 02/19/19 02/19/19





 06:59 18:59


 


Intake Total 0 


 


Output Total  200


 


Balance 0 -200














- Medications


Medications: 


                               Current Medications





Acetaminophen (Tylenol 325mg Tab)  650 mg PO Q6 PRN


   PRN Reason: Headache


Acetaminophen (Tylenol 325mg Tab)  650 mg PO Q4 PRN


   PRN Reason: Other


   Last Admin: 02/19/19 08:25 Dose:  650 mg


Atorvastatin Calcium (Lipitor)  20 mg PO DAILY [facility]


   Last Admin: 02/19/19 08:22 Dose:  20 mg


Insulin Human Regular (Humulin R)  0 units SC ACCU-CHECK [facility]; Protocol


   Last Admin: 02/19/19 08:20 Dose:  2 units


Morphine Sulfate (Morphine)  2 mg IVP Q4 PRN


   PRN Reason: Pain, severe (8-10)


   Last Admin: 02/18/19 19:48 Dose:  2 mg


Polyethylene Glycol (Miralax)  17 gm PO HS [facility]


Propranolol HCl (Inderal)  10 mg PO TID [facility]


   Last Admin: 02/19/19 08:22 Dose:  10 mg


Ramipril (Altace)  2.5 mg PO DAILY [facility]


   Last Admin: 02/19/19 08:22 Dose:  2.5 mg











- Labs


Labs: 


                                        





                                 02/19/19 04:45 





                                 02/19/19 04:45 





                                        











PT  11.6 Seconds (9.8-13.1)   02/18/19  06:00    


 


INR  1.0   02/18/19  06:00    


 


APTT  38.9 Seconds (25.6-37.1)  H  02/18/19  06:00

## 2019-02-19 NOTE — OP
PROCEDURE DATE:  02/18/2019



PREOPERATIVE DIAGNOSIS:  Right chronic subdural hematoma.



POSTOPERATIVE DIAGNOSIS:  Right chronic subdural hematoma.



OPERATIVE PROCEDURE:  Right craniotomy, evacuation of subdural hematoma.



SURGEON:  Raghav Leach MD



DESCRIPTION OF PROCEDURE:  The patient was brought to the operating room

and intubated appropriately.  Head was turned to the left.  The right

frontal parietal region was prepped and draped in the usual manner.  A Lazy

S incision was marked out just above the superior temple line based about

the suture.  This was instilled with lidocaine with epinephrine and then

incised sharply with Weitlaner retractors placed after retracting scalp

flexed, two cyndy holes were made, one inferior and one posterior connected

with the craniotome.  The underlying dura was opened in a cruciate manner. 

The underlying fluid was _____ deep yellow.  Specimen was sent and there

was a fairly thick membrane which was in between the dura and the cortical

surface with minimal outer membrane and minimal inner membrane.  This

membrane was coagulated and divided.  Specimen was sent.  The subdural

space was thoroughly irrigated in multiple amounts of antibiotic irrigation

until all returns were clear.  A #12 Malay red rubber catheter with extra

side holes was passed through a separate stab wound into the subdural space

and irrigated through the drain as well as through the craniotomy site

again until all returns were clear.  The dura was then re-approximated with

4-0 Nurolon.  The Gelfoam was placed over the exposed dura and three

CranioFix plates, one 16 mm and two 12 mm were then employed to replace the

bone plate, and at this point, the scalp was re-approximated in two layers,

2-0 Vicryl and skin staples.  The drain was tacked in the usual manner. 

Collection bag was attached.  Sterile dressing was applied and the patient

is currently is in the process of being woken from anesthesia.  The blood

loss was approximately 150 mL.  All counts were correct.  Specimen was

subdural fluid and subdural membrane.





__________________________________________

Raghav Leach MD





DD:  02/18/2019 12:06:41

DT:  02/18/2019 14:02:32

Job # [id number]

## 2019-02-19 NOTE — PQF
PROVIDER RESPONSE TEXT:



Provider was unable to determine a response for this query.



REVIEWER QUERY TEXT:



Conflicting Documentation Clarification



A single mention of Rule out Acute Rhabdomyolysis is documented in the .EMR

Please  document if the condition is:

-- Confirmed and current

-- Confirmed, treated and resolved

-- Ruled out

-- Other, please specify



Total Creatinine Kinase: 572->245



2/15 Critical Care note includes: IMPRESSION / MAJOR PROBLEMS NOW: 1.S/p Fall at Home

2.R SDH with elements of chronicity, with small midline shift.

3.Azotemia /dehydration

4. r/o Acute Rhabdomyolysis

5. h/o Dementia



IVF 1,000 ccs x 1 hr.->125 ccs x 8hrs















The patient's Clinical Indicators include:

---





Query created by: Yazmin Amado on 2/18/2019 2:13 PM















Electronically signed by:  Harsha Katz MD 2/19/2019 10:28 PM

## 2019-02-19 NOTE — PQF
PROVIDER RESPONSE TEXT:



Provider was unable to determine a response for this query.



REVIEWER QUERY TEXT:



Conflicting Documentation Clarification



A single mention of Rule out Acute Rhabdomyolysis is documented in the .EMR

Please  document if the condition is:

-- Confirmed and current

-- Confirmed, treated and resolved

-- Ruled out

-- Other, please specify



Total Creatinine Kinase: 572->245



2/15 Critical Care note includes: IMPRESSION / MAJOR PROBLEMS NOW: 1.S/p Fall at Home

2.R SDH with elements of chronicity, with small midline shift.

3.Azotemia /dehydration

4. r/o Acute Rhabdomyolysis

5. h/o Dementia



IV hydration















The patient's Clinical Indicators include:

--





Query created by: Yazmin Amado on 2/19/2019 11:46 AM















Electronically signed by:  Vijay Murillo 2/19/2019 10:02 PM

## 2019-02-20 LAB
ALBUMIN SERPL-MCNC: 4 G/DL (ref 3.5–5)
ALBUMIN/GLOB SERPL: 1.2 {RATIO} (ref 1–2.1)
ALT SERPL-CCNC: 39 U/L (ref 21–72)
AST SERPL-CCNC: 26 U/L (ref 17–59)
BUN SERPL-MCNC: 13 MG/DL (ref 9–20)
CALCIUM SERPL-MCNC: 9.5 MG/DL (ref 8.4–10.2)
ERYTHROCYTE [DISTWIDTH] IN BLOOD BY AUTOMATED COUNT: 13.8 % (ref 11.5–14.5)
GFR NON-AFRICAN AMERICAN: > 60
HGB BLD-MCNC: 14.9 G/DL (ref 12–18)
MCH RBC QN AUTO: 32 PG (ref 27–31)
MCHC RBC AUTO-ENTMCNC: 33.7 G/DL (ref 33–37)
MCV RBC AUTO: 95 FL (ref 80–94)
PLATELET # BLD: 203 K/UL (ref 130–400)
RBC # BLD AUTO: 4.65 MIL/UL (ref 4.4–5.9)
WBC # BLD AUTO: 7.4 K/UL (ref 4.8–10.8)

## 2019-02-20 RX ADMIN — POLYETHYLENE GLYCOL 3350 SCH GM: 17 POWDER, FOR SOLUTION ORAL at 22:00

## 2019-02-20 NOTE — CT
Date of service: 



02/20/2019



PROCEDURE:  CT HEAD WITHOUT CONTRAST.



HISTORY:

follow up



COMPARISON:

Noncontrast head CT 02/15/2019.



TECHNIQUE:

Axial computed tomography images were obtained through the head/brain 

without intravenous contrast.  



Radiation dose:



Total exam DLP = 822.62 mGy-cm.



This CT exam was performed using one or more of the following dose 

reduction techniques: Automated exposure control, adjustment of the 

mA and/or kV according to patient size, and/or use of iterative 

reconstruction technique.



FINDINGS:



HEMORRHAGE:

In the interval, the patient is now status post right frontal 

craniotomy with subdural drainage catheter in situ at the right 

subdural hematoma.  Pneumocephaly is identified postoperatively with 

limited acute or subacute hemorrhage identified at the dependent and 

superior portion of the subdural hematoma which remains predominantly 

chronic in density.  The hematoma measures 2.4 cm greatest transverse 

dimension and 10.3 cm length with limited reduction in leftward 

midline shift now measuring 8 mm (remeasured and previous CT at 9.1 

mm).  A mild right frontoparietal scalp hematoma is appreciated in 

the interval. 



BRAIN:

Stable loss of sulcation at the mid to upper right cerebral sulci is 

reiterated due to subdural hematoma.  Leftward shift as discussed 

above.  No additional interval extra-axial fluid collection 

appreciable.  Posterior fossa contents appear stable and 

unremarkable. 



VENTRICLES:

Unremarkable exclusive of leftward shift.  Basilar cisterns remain 

well preserved.  No hydrocephalus. 



CALVARIUM:

Unremarkable.



PARANASAL SINUSES:

Unremarkable as visualized. No significant inflammatory changes.



MASTOID AIR CELLS:

Unremarkable as visualized. No inflammatory changes.



OTHER FINDINGS:

None.



IMPRESSION:

Interval right frontoparietal craniotomy with right subdural drainage 

catheter in position.  Limited acute subacute subdural hemorrhage is 

identified within predominate chronic right subdural hematoma 

slightly smaller in volume with limited reduction in leftward midline 

shift of 8 mm as discussed above.  Limited postoperative 

pneumocephaly noted as well as right frontal parietal mild scalp 

hematoma.  Continued clinical and CT vigilance recommended.

## 2019-02-20 NOTE — CP.PCM.PN
Subjective





- Date & Time of Evaluation


Date of Evaluation: 02/19/19


Time of Evaluation: 11:00





- Subjective


Subjective: 





Patient is doing very well  on postop day # 1


Has minimal headaches


Has no fever.


Vitals are stable





Objective





- Vital Signs/Intake and Output


Vital Signs (last 24 hours): 


                                        











Temp Pulse Resp BP Pulse Ox


 


 97.8 F   69   15   131/65   98 


 


 02/20/19 06:00  02/20/19 06:00  02/20/19 06:00  02/20/19 06:00  02/20/19 06:00








Intake and Output: 


                                        











 02/20/19 02/20/19





 06:59 18:59


 


Intake Total 370 


 


Output Total 1090 


 


Balance -720 














- Medications


Medications: 


                               Current Medications





Acetaminophen (Tylenol 325mg Tab)  650 mg PO Q6 PRN


   PRN Reason: Headache


Acetaminophen (Tylenol 325mg Tab)  650 mg PO Q4 PRN


   PRN Reason: Other


   Last Admin: 02/20/19 05:19 Dose:  650 mg


Atorvastatin Calcium (Lipitor)  20 mg PO DAILY [facility]


   Last Admin: 02/19/19 08:22 Dose:  20 mg


Insulin Human Regular (Humulin R)  0 units SC ACCU-CHECK [facility]; Protocol


   Last Admin: 02/20/19 06:24 Dose:  2 units


Morphine Sulfate (Morphine)  2 mg IVP Q4 PRN


   PRN Reason: Pain, severe (8-10)


   Last Admin: 02/18/19 19:48 Dose:  2 mg


Polyethylene Glycol (Miralax)  17 gm PO HS [facility]


   Last Admin: 02/19/19 21:05 Dose:  17 gm


Propranolol HCl (Inderal)  10 mg PO TID [facility]


   Last Admin: 02/19/19 17:21 Dose:  10 mg


Ramipril (Altace)  2.5 mg PO DAILY [facility]


   Last Admin: 02/19/19 08:22 Dose:  2.5 mg











- Labs


Labs: 


                                        





                                 02/20/19 04:35 





                                 02/20/19 04:35 





                                        











PT  11.6 Seconds (9.8-13.1)   02/18/19  06:00    


 


INR  1.0   02/18/19  06:00    


 


APTT  38.9 Seconds (25.6-37.1)  H  02/18/19  06:00    














- Head Exam


Head Exam: NORMAL INSPECTION





- Eye Exam


Eye Exam: Normal appearance





- Respiratory Exam


Respiratory Exam: Clear to Ausculation Bilateral





- Cardiovascular Exam


Cardiovascular Exam: REGULAR RHYTHM





- GI/Abdominal Exam


GI & Abdominal Exam: Normal Bowel Sounds





- Neurological Exam


Neurological Exam: Awake, Oriented x3





- Psychiatric Exam


Psychiatric exam: Normal Mood





Assessment and Plan


(1) Subdural hematoma


Status: Acute   





(2) Hypertension


Status: Acute   





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


Status: Acute   





(4) Fall


Status: Acute   





(5) Depression


Status: Acute   





(6) Essential tremor


Status: Acute   





(7) Osteoarthritis


Status: Acute   





(8) Gait difficulty


Status: Acute   





- Assessment and Plan (Free Text)


Plan: 





Cont meds


Cont icu for 24 hrs


start PT as soon as drain is removed.


acutre rehab eval

## 2019-02-20 NOTE — CP.PCM.PN
Subjective





- Date & Time of Evaluation


Date of Evaluation: 02/20/19


Time of Evaluation: 13:15





- Subjective


Subjective: 





pt fully a a ox3 


no motor deficit


ct showed some residual sdh


Drain d/gavin


will mobilize pt get oob and adv act


suggest transfer to subacute when othewise medically clear





Objective





- Vital Signs/Intake and Output


Vital Signs (last 24 hours): 


                                        











Temp Pulse Resp BP Pulse Ox


 


 98.4 F   65   17   117/69   98 


 


 02/20/19 12:00  02/20/19 12:14  02/20/19 12:00  02/20/19 12:14  02/20/19 12:00








Intake and Output: 


                                        











 02/20/19 02/20/19





 06:59 18:59


 


Intake Total 370 500


 


Output Total 1090 


 


Balance -720 500














- Medications


Medications: 


                               Current Medications





Acetaminophen (Tylenol 325mg Tab)  650 mg PO Q6 PRN


   PRN Reason: Headache


Acetaminophen (Tylenol 325mg Tab)  650 mg PO Q4 PRN


   PRN Reason: Other


   Last Admin: 02/20/19 05:19 Dose:  650 mg


Atorvastatin Calcium (Lipitor)  20 mg PO DAILY [facility]


   Last Admin: 02/20/19 08:56 Dose:  20 mg


Insulin Human Regular (Humulin R)  0 units SC ACCU-CHECK [facility]; Protocol


   Last Admin: 02/20/19 11:26 Dose:  4 units


Morphine Sulfate (Morphine)  2 mg IVP Q4 PRN


   PRN Reason: Pain, severe (8-10)


   Last Admin: 02/18/19 19:48 Dose:  2 mg


Polyethylene Glycol (Miralax)  17 gm PO HS [facility]


   Last Admin: 02/19/19 21:05 Dose:  17 gm


Propranolol HCl (Inderal)  10 mg PO TID [facility]


   Last Admin: 02/20/19 12:14 Dose:  10 mg


Ramipril (Altace)  2.5 mg PO DAILY [facility]


   Last Admin: 02/20/19 08:56 Dose:  2.5 mg











- Labs


Labs: 


                                        





                                 02/20/19 04:35 





                                 02/20/19 04:35 





                                        











PT  11.6 Seconds (9.8-13.1)   02/18/19  06:00    


 


INR  1.0   02/18/19  06:00    


 


APTT  38.9 Seconds (25.6-37.1)  H  02/18/19  06:00

## 2019-02-20 NOTE — CP.CCUPN
CCU Subjective





- Physician Review


Subjective (Free Text): 


S/p SDH evacuation POD 31-2, mild disorientation and forgetfulness to place and 

time, no new focal deficits, exhibits mild clumsiness, but no speech 

impediments. 





Other vitals and I/O's reviewed.  -146 range, HR 60s, febrile, RR 16, 

SPO2 99% on RA


ROS:  No other pertinent negs or positives on 10+  system review. 


PMSFH:   All other Nursing and physician documentation reviewed to date; no new 

pertinent info noted relevant to current medical problems.








EXAM- 


HEENT:  no icterus, no gaze preference, equal Pupils, both reactive, no 

nystagmus, drain intach over R temporal area, sanguineous drainage 190 ml total 

so sfar. 


NECK: no JVD visible, supple, carotids equal upstroke bilat/no bruit


CHEST: clear BS  bilat, no wheezes audible, mild tenderness on palpation over 

Left 5th -6th lateral ribs


HEART: regular, distant, S1S2, no rubs


ABD:  soft, no distension, no focal  tenderness, no tympany, no guarding, no 

organomegaly, BS hypoactive.  


EXT:   no LE edema, no mottling;  no calf tenderness or palpable cords, distal 

pulses intact and symmetrical, no cyanosis; small bruise with scar over L elbow,

small ecchymosis over L lateral biceps area.


NEURO:  mild tremors LUE upon motion, none at rest,  no gross focal motor 

deficits, sensory intact bilat, GCS= 15


SKIN:   no rashes,  warm and dry





LABS:  


WBC= 7.4


HGB= 14.9


PLTs=  203K





Na= 135


K=  4.2


CL= 95


HCO3= 29


BUN/Cr=  13/0.7


BS= 165








Repeat CT Head:  results reviewed. 





IMPRESSION / MAJOR PROBLEMS NOW:  


1.   S/p Fall at Home


2.   R SDH with elements of chronicity, with small midline shift.  


3.   Azotemia /dehydration


4.   r/o Acute Rhabdomyolysis


5.   h/o Dementia





PLAN: 


1.   NeuroSurg eval, Neurochecks, HOB elevation, SCDs, keep SBP no higher than 

130-140, maintain normogylcemia, IVF hydration. Repeat Brain imaging as per 

Neurology / NeuroSurg, or with any acute deterioration evident. 


2.   Physical therapy eval when OK by NeuroSurg


3.   Holding all CNS-acting meds from Home: Risperdal, Sertraline. No s/sx of 

withdrawal nor increased agitation.

## 2019-02-21 LAB
ALBUMIN SERPL-MCNC: 3.8 G/DL (ref 3.5–5)
ALBUMIN/GLOB SERPL: 1.3 {RATIO} (ref 1–2.1)
ALT SERPL-CCNC: 48 U/L (ref 21–72)
AST SERPL-CCNC: 37 U/L (ref 17–59)
BUN SERPL-MCNC: 15 MG/DL (ref 9–20)
CALCIUM SERPL-MCNC: 9.1 MG/DL (ref 8.4–10.2)
ERYTHROCYTE [DISTWIDTH] IN BLOOD BY AUTOMATED COUNT: 14 % (ref 11.5–14.5)
GFR NON-AFRICAN AMERICAN: > 60
HGB BLD-MCNC: 14.4 G/DL (ref 12–18)
MCH RBC QN AUTO: 31.6 PG (ref 27–31)
MCHC RBC AUTO-ENTMCNC: 33.7 G/DL (ref 33–37)
MCV RBC AUTO: 93.8 FL (ref 80–94)
PLATELET # BLD: 200 K/UL (ref 130–400)
RBC # BLD AUTO: 4.54 MIL/UL (ref 4.4–5.9)
WBC # BLD AUTO: 6.8 K/UL (ref 4.8–10.8)

## 2019-02-21 RX ADMIN — POLYETHYLENE GLYCOL 3350 SCH GM: 17 POWDER, FOR SOLUTION ORAL at 23:57

## 2019-02-21 NOTE — CP.PCM.PN
Subjective





- Date & Time of Evaluation


Date of Evaluation: 02/20/19


Time of Evaluation: 11:30





- Subjective


Subjective: 








Pt seen and assessed at bedside, diagnosis of subdural hematoma, which required 

surgical evacuation. The patient is currently alert and awake but confused. Mild

left sided weakness is noted. Neurology and neurosurgery are on consult. 





Review of Systems





- Review of Systems


All systems: reviewed and no additional remarkable complaints except


Review of Systems: 





pain





- Constitutional


Constitutional: Weakness





Objective





- Vital Signs/Intake and Output


Vital Signs (last 24 hours): 


                                        











Temp Pulse Resp BP Pulse Ox


 


 98.5 F   62   15   116/66   97 


 


 02/21/19 20:00  02/21/19 22:00  02/21/19 22:00  02/21/19 22:00  02/21/19 22:00








Intake and Output: 


                                        











 02/21/19 02/22/19





 18:59 06:59


 


Intake Total 1200 0


 


Output Total 700 200


 


Balance 500 -200














- Medications


Medications: 


                               Current Medications





Acetaminophen (Tylenol 325mg Tab)  650 mg PO Q6 PRN


   PRN Reason: Headache


Acetaminophen (Tylenol 325mg Tab)  650 mg PO Q4 PRN


   PRN Reason: Other


   Last Admin: 02/20/19 05:19 Dose:  650 mg


Atorvastatin Calcium (Lipitor)  20 mg PO DAILY [facility] include 234 beds at the [facility]


   Last Admin: 02/21/19 08:36 Dose:  20 mg


Insulin Human Regular (Humulin R)  0 units SC ACCU-CHECK Counts include 234 beds at the [facility]; Protocol


   Last Admin: 02/21/19 16:41 Dose:  Not Given


Morphine Sulfate (Morphine)  2 mg IVP Q4 PRN


   PRN Reason: Pain, severe (8-10)


   Last Admin: 02/18/19 19:48 Dose:  2 mg


Polyethylene Glycol (Miralax)  17 gm PO HS Counts include 234 beds at the [facility]


   Last Admin: 02/20/19 22:00 Dose:  17 gm


Propranolol HCl (Inderal)  10 mg PO TID Counts include 234 beds at the [facility]


   Last Admin: 02/21/19 16:07 Dose:  10 mg


Ramipril (Altace)  2.5 mg PO DAILY Counts include 234 beds at the [facility]


   Last Admin: 02/21/19 08:36 Dose:  2.5 mg











- Labs


Labs: 


                                        





                                 02/21/19 04:35 





                                 02/21/19 04:35 





                                        











PT  11.6 Seconds (9.8-13.1)   02/18/19  06:00    


 


INR  1.0   02/18/19  06:00    


 


APTT  38.9 Seconds (25.6-37.1)  H  02/18/19  06:00    














- Head Exam


Additional comments: 





incision/surgical site on right temporal area of the head. 





- Eye Exam


Eye Exam: EOMI, Normal appearance, PERRL


Pupil Exam: PERRL





- ENT Exam


ENT Exam: Mucous Membranes Moist





- Neck Exam


Neck Exam: Normal Inspection





- Respiratory Exam


Respiratory Exam: Decreased Breath Sounds





- Cardiovascular Exam


Cardiovascular Exam: REGULAR RHYTHM, +S1, +S2





- GI/Abdominal Exam


GI & Abdominal Exam: Soft, Normal Bowel Sounds





- Extremities Exam


Additional comments: 





left sided weakness. Tremor noted to upper extremities bilaterally. 





- Back Exam


Back Exam: tenderness





- Neurological Exam


Neurological Exam: Alert, Awake


Neuro motor strength exam: Left Upper Extremity: 4, Right Upper Extremity: 5, 

Left Lower Extremity: 3, Right Lower Extremity: 5


Additional comments: 


confused.





- Psychiatric Exam


Psychiatric exam: Normal Affect, Normal Mood





- Skin


Skin Exam: Dry, Warm


Additional comments: 





abrasion to the left elbow





Assessment and Plan


(1) Subdural hematoma


Assessment & Plan: 


1.) Subdural Hematoma S/P fall at home. 





-Initial CT Head showed right sided subdural hematoma with midline shift. 


-evacuation of hematoma was done by neurosurgery.


-neurosurgery consult input appreciated.


-neuro checks + SBP monitoring.


-Left upper and left lower extremity weakness noted; continue working with 

PT/OT. 


Status: Acute

## 2019-02-22 ENCOUNTER — HOSPITAL ENCOUNTER (INPATIENT)
Dept: HOSPITAL 14 - H.REHABAC | Age: 74
LOS: 18 days | Discharge: NURSING, REHAB OR LONG-TERM CARE | DRG: 949 | End: 2019-03-12
Attending: FAMILY MEDICINE | Admitting: FAMILY MEDICINE
Payer: MEDICARE

## 2019-02-22 VITALS
RESPIRATION RATE: 19 BRPM | SYSTOLIC BLOOD PRESSURE: 126 MMHG | DIASTOLIC BLOOD PRESSURE: 67 MMHG | HEART RATE: 67 BPM | OXYGEN SATURATION: 98 %

## 2019-02-22 VITALS — TEMPERATURE: 98.3 F

## 2019-02-22 VITALS — BODY MASS INDEX: 21.7 KG/M2

## 2019-02-22 DIAGNOSIS — G81.94: ICD-10-CM

## 2019-02-22 DIAGNOSIS — Z48.812: Primary | ICD-10-CM

## 2019-02-22 DIAGNOSIS — E78.00: ICD-10-CM

## 2019-02-22 DIAGNOSIS — E11.9: ICD-10-CM

## 2019-02-22 DIAGNOSIS — Z86.73: ICD-10-CM

## 2019-02-22 DIAGNOSIS — I10: ICD-10-CM

## 2019-02-22 DIAGNOSIS — G20: ICD-10-CM

## 2019-02-22 LAB
ALBUMIN SERPL-MCNC: 3.8 G/DL (ref 3.5–5)
ALBUMIN/GLOB SERPL: 1.3 {RATIO} (ref 1–2.1)
ALT SERPL-CCNC: 70 U/L (ref 21–72)
AST SERPL-CCNC: 46 U/L (ref 17–59)
BUN SERPL-MCNC: 16 MG/DL (ref 9–20)
CALCIUM SERPL-MCNC: 9.1 MG/DL (ref 8.4–10.2)
ERYTHROCYTE [DISTWIDTH] IN BLOOD BY AUTOMATED COUNT: 13.9 % (ref 11.5–14.5)
GFR NON-AFRICAN AMERICAN: > 60
HGB BLD-MCNC: 14 G/DL (ref 12–18)
MCH RBC QN AUTO: 31.5 PG (ref 27–31)
MCHC RBC AUTO-ENTMCNC: 33.4 G/DL (ref 33–37)
MCV RBC AUTO: 94.2 FL (ref 80–94)
PLATELET # BLD: 223 K/UL (ref 130–400)
RBC # BLD AUTO: 4.46 MIL/UL (ref 4.4–5.9)
WBC # BLD AUTO: 6.9 K/UL (ref 4.8–10.8)

## 2019-02-22 PROCEDURE — F06Z6ZZ COMMUNICATIVE/COGNITIVE INTEGRATION SKILLS TREATMENT: ICD-10-PCS | Performed by: FAMILY MEDICINE

## 2019-02-22 PROCEDURE — F07Z8FZ TRANSFER TRAINING TREATMENT USING ASSISTIVE, ADAPTIVE, SUPPORTIVE OR PROTECTIVE EQUIPMENT: ICD-10-PCS | Performed by: FAMILY MEDICINE

## 2019-02-22 PROCEDURE — F08Z1FZ DRESSING TECHNIQUES TREATMENT USING ASSISTIVE, ADAPTIVE, SUPPORTIVE OR PROTECTIVE EQUIPMENT: ICD-10-PCS | Performed by: FAMILY MEDICINE

## 2019-02-22 PROCEDURE — F08Z2FZ GROOMING/PERSONAL HYGIENE TREATMENT USING ASSISTIVE, ADAPTIVE, SUPPORTIVE OR PROTECTIVE EQUIPMENT: ICD-10-PCS | Performed by: FAMILY MEDICINE

## 2019-02-22 PROCEDURE — F07L6GZ THERAPEUTIC EXERCISE TREATMENT OF MUSCULOSKELETAL SYSTEM - LOWER BACK / LOWER EXTREMITY USING AEROBIC ENDURANCE AND CONDITIONING EQUIPMENT: ICD-10-PCS | Performed by: FAMILY MEDICINE

## 2019-02-22 PROCEDURE — F07Z9FZ GAIT TRAINING/FUNCTIONAL AMBULATION TREATMENT USING ASSISTIVE, ADAPTIVE, SUPPORTIVE OR PROTECTIVE EQUIPMENT: ICD-10-PCS | Performed by: FAMILY MEDICINE

## 2019-02-22 PROCEDURE — F07Z4FZ WHEELCHAIR MOBILITY TREATMENT USING ASSISTIVE, ADAPTIVE, SUPPORTIVE OR PROTECTIVE EQUIPMENT: ICD-10-PCS | Performed by: FAMILY MEDICINE

## 2019-02-22 PROCEDURE — F07Z5FZ BED MOBILITY TREATMENT USING ASSISTIVE, ADAPTIVE, SUPPORTIVE OR PROTECTIVE EQUIPMENT: ICD-10-PCS | Performed by: FAMILY MEDICINE

## 2019-02-22 RX ADMIN — POLYETHYLENE GLYCOL 3350 SCH GM: 17 POWDER, FOR SOLUTION ORAL at 22:09

## 2019-02-22 NOTE — PCM.OPOC
Physiatry Overall Plan of Care





- Overall Plan of Care


Estimated Length of Stay in Weeks: 3


Rehab Impairment: Mobility, Gait, Cognition, Balance, Coordination


Etiologic Diagnosis: Cerebrovascular Accident


Rehab/Medical Prognosis: Good





- Anticipated Interventions


Physical Therapy:: Yes


Occupational Therapy:: Yes


Speech Therapy:: Yes


Recreational Therapy:: Yes





- Therapy Goals


Bed Mobility: Supervision


Ambulation: Supervision


Functional Positional Changes:: Supervision





- Discharge Plan


Identification of Barriers to Discharge: Home Situation


Discharge Destination: Home

## 2019-02-22 NOTE — CP.PCM.CON
History of Present Illness





- History of Present Illness


History of Present Illness: 





Dr Del Angel PMR consultation on Star Burnett, born 1945 who has been admitted 

to [facility] for acute inpatient rehabilitation following an admission with a SDH 

that necessitated evacuation.  He had fallen and been on floor for 2 days, unab

le to get up.  Post op stable. Mild left UE and LE weakness





Review of Systems





- Constitutional


Constitutional: absent: Anorexia





- EENT


Eyes: absent: Change in Vision


Ears: absent: Ear Discharge, Ear Pain


Nose/Mouth/Throat: absent: Nasal Congestion, Nasal Discharge





- Cardiovascular


Cardiovascular: absent: Chest Pain





- Respiratory


Respiratory: absent: Dyspnea





- Gastrointestinal


Gastrointestinal: absent: Abdominal Pain, Constipation, Fecal Incontinence





- Musculoskeletal


Musculoskeletal: absent: Back Pain





- Integumentary


Integumentary: absent: Bleeding Lesions





- Neurological


Neurological: Focal Weakness (left arm and leg).  absent: Abnormal Movements, 

Dizziness





- Psychiatric


Psychiatric: absent: Anxiety





Past Patient History





- Past Social History


Smoking Status: Never Smoked


Alcohol: None


Drugs: Denies


Home Situation {Lives}: Alone (ambulates with a cane usually)





- CARDIAC


Hx Cardiac Disorders: Yes


Hx Hypertension: Yes





- ENDOCRINE/METABOLIC


Hx Endocrine Disorders: Yes





- MUSCULOSKELETAL/RHEUMATOLOGICAL


Hx Falls: Yes





- PSYCHIATRIC


Hx Substance Use: No





Meds


Allergies/Adverse Reactions: 


                                    Allergies











Allergy/AdvReac Type Severity Reaction Status Date / Time


 


No Known Allergies Allergy   Verified 02/22/19 18:46














Physical Exam





- Constitutional


Appears: Non-toxic





- Head Exam


Head Exam: absent: NORMAL INSPECTION (staples on the right scalp)





- Eye Exam


Eye Exam: EOMI





- ENT Exam


ENT Exam: Mucous Membranes Moist





- Respiratory Exam


Respiratory Exam: NORMAL BREATHING PATTERN





- Cardiovascular Exam


Cardiovascular Exam: REGULAR RHYTHM





- GI/Abdominal Exam


GI & Abdominal Exam: Distended.  absent: Firm





- Extremities Exam


Extremities exam: Negative for: calf tenderness





- Neurological Exam


Neurological exam: Alert, CN II-XII Intact, Oriented x3





- Psychiatric Exam


Psychiatric exam: Normal Affect, Normal Mood





- Skin


Skin Exam: Warm (staples right scalp CDI)





Assessment & Plan





- Assessment and Plan (Free Text)


Assessment: 





73 year old male had a fall and SDH s/p craniotomy and evacuation


mild left UE/LE weakness


PT/OT to continue to help increase functional independence


Team conference for d/c planning


Pain: controlled   


Vascular: no evidence of DVT


GI: No evidence of constipation or diarrhea





Patient is an excellent acute rehabilitation candidate and will have focused 

Speech, PT, OT and recreational therapy to help facilitate a safe and 

appropriate d/c plan


impairment code:01.1

## 2019-02-23 RX ADMIN — POLYETHYLENE GLYCOL 3350 SCH GM: 17 POWDER, FOR SOLUTION ORAL at 21:24

## 2019-02-23 NOTE — CP.PCM.HP
History of Present Illness





- History of Present Illness


History of Present Illness: 





72 yo male admitted recently due to subdural hematoma s/p fall at home. 

Evacuation of hematoma completed by neurosurgery.


Patient is now admitted to acute rehab for further therapy.


no complaints offered at this time


feels well


denies chest pain, sob, palpitations, headache.





Present on Admission





- Present on Admission


Any Indicators Present on Admission: No





Review of Systems





- Review of Systems


All systems: reviewed and no additional remarkable complaints except (mentioned 

above)





Past Patient History





- Past Medical History & Family History


Past Medical History?: Yes


Past Family History: Reviewed and not pertinent





- Past Social History


Smoking Status: Never Smoked





- CARDIAC


Hx Cardiac Disorders: Yes (cardiac arrythmias)


Hx Hypercholesterolemia: Yes





- NEUROLOGICAL


HX Cerebrovascular Accident: Yes





- RENAL


Other/Comment: Azotemia





- ENDOCRINE/METABOLIC


Hx Diabetes Mellitus Type 2: Yes





- MUSCULOSKELETAL/RHEUMATOLOGICAL


Hx Arthritis: Yes (OA)





- PSYCHIATRIC


Hx Psychophysiologic Disorder: Yes


Hx Depression: Yes


Hx Substance Use: No





- ANESTHESIA


Hx Anesthesia: Yes


Hx Anesthesia Reactions: No


Hx Malignant Hyperthermia: No


Has any member of the family had a problem w/ anesthesia?: No





Meds


Allergies/Adverse Reactions: 


                                    Allergies











Allergy/AdvReac Type Severity Reaction Status Date / Time


 


No Known Allergies Allergy   Verified 02/22/19 18:46














Physical Exam





- Constitutional


Appears: Non-toxic





- Head Exam


Additional comments: 





incision/surgical site on right temporal area of the head with staples, c/d/i.





- Respiratory Exam


Respiratory Exam: NORMAL BREATHING PATTERN





- Cardiovascular Exam


Cardiovascular Exam: +S1, +S2





- GI/Abdominal Exam


GI & Abdominal Exam: Soft





- Neurological Exam


Neurological exam: Alert





- Psychiatric Exam


Psychiatric exam: Normal Affect, Normal Mood





- Skin


Skin Exam: Normal Color, Warm





Results





- Vital Signs


Recent Vital Signs: 





                                Last Vital Signs











Temp  98.2 F   02/23/19 07:30


 


Pulse  75   02/23/19 17:24


 


Resp  20   02/23/19 07:30


 


BP  128/60   02/23/19 17:24


 


Pulse Ox  95   02/23/19 09:41














- Labs


Labs: 





                         Laboratory Results - last 24 hr











  02/22/19 02/23/19 02/23/19





  21:27 05:40 11:39


 


POC Glucose (mg/dL)  124 H  146 H  224 H














  02/23/19





  16:45


 


POC Glucose (mg/dL)  187 H














Assessment & Plan


(1) Subdural hematoma


Status: Acute   





(2) Fall


Status: Acute   





- Assessment and Plan (Free Text)


Assessment: 





cont therapy


cont meds


dr huitron consulted, appreciate recommendations


monitor vitals


rest of plan as ordered

## 2019-02-23 NOTE — CP.PCM.PN
Subjective





- Date & Time of Evaluation


Date of Evaluation: 02/21/19


Time of Evaluation: 11:00





- Subjective


Subjective: 





patient seen and examined at bedside.


Interim events noted


No complaints offered at this time, post op feeling well


denies cp/sob/fever/chills.


available diagnostic data reviewed





Review of Systems All systems: reviewed and no additional remarkable complaints 

except mentioned above





Objective





Vital Signs Stable


- Constitutional


Appears: Non-toxic, No Acute Distress





Head Exam: staples of right head noted from surgical site, c/d/i





Eye Exam: Normal appearance





Respiratory Exam: NORMAL BREATHING PATTERN





Cardiovascular Exam: +S1, +S2





GI & Abdominal Exam: Soft





Neurological Exam: Alert, Awake





Psychiatric exam: Normal Affect, Normal Mood





Skin Exam: Normal Color, Warm





Assessment and Plan





monitor vitals


monitor labs


Cont meds


Cont tx


consultants appreciated input


dispo planning


rest of plan as ordered








Assessment and Plan


(1) Subdural hematoma


Status: Acute

## 2019-02-24 LAB
ALBUMIN SERPL-MCNC: 3.4 G/DL (ref 3.5–5)
ALBUMIN/GLOB SERPL: 0.9 {RATIO} (ref 1–2.1)
ALT SERPL-CCNC: 93 U/L (ref 21–72)
AST SERPL-CCNC: 59 U/L (ref 17–59)
BUN SERPL-MCNC: 14 MG/DL (ref 9–20)
CALCIUM SERPL-MCNC: 9.2 MG/DL (ref 8.4–10.2)
ERYTHROCYTE [DISTWIDTH] IN BLOOD BY AUTOMATED COUNT: 13.7 % (ref 11.5–14.5)
GFR NON-AFRICAN AMERICAN: > 60
HGB BLD-MCNC: 13.9 G/DL (ref 12–18)
INR PPP: 1.1
MCH RBC QN AUTO: 31.3 PG (ref 27–31)
MCHC RBC AUTO-ENTMCNC: 33.9 G/DL (ref 33–37)
MCV RBC AUTO: 92.4 FL (ref 80–94)
PLATELET # BLD: 239 K/UL (ref 130–400)
PROTHROMBIN TIME: 12.8 SECONDS (ref 9.8–13.1)
RBC # BLD AUTO: 4.44 MIL/UL (ref 4.4–5.9)
WBC # BLD AUTO: 6.5 K/UL (ref 4.8–10.8)

## 2019-02-24 RX ADMIN — POLYETHYLENE GLYCOL 3350 SCH GM: 17 POWDER, FOR SOLUTION ORAL at 21:49

## 2019-02-24 NOTE — PQF
PROVIDER RESPONSE TEXT:



Provider was unable to determine a response for this query.



REVIEWER QUERY TEXT:



Pressure Ulcer Type



Pressure ulcers  are  documented in the Medical Record. Please specify the location, and stage of the
  pressure ulcer(s)Versus;

-- Disagree

--Unable to determine

--Other explanation of clinical finding



Nurses Admission Assess: Pressure Ulcer POA: none



2/18 Staff RN Pressure Ulcer Assessment::1. left elbow- full thickness tissue loss in which the base 
of the ulcer is covered

2, sacrum-partial thickness loss of dermis presenting as a shallow open ulcer



2/19 Wound RN note; left elbow has a scab that was present on admission post fall. It is intact with 
no erythema.



2/21 Staff RN: Pressure Ulcer Assessment: Sacrum: Intact skin with non-blanchable redness of a locali
zed area









Stage of each pressure ulcer (National Pressure Ulcer Advisory Panel definitions):

-- Stage I:  Intact skin with non-blanchable redness of a localized area

-- Stage II:  Partial thickness skin loss involving dermis with a shallow open ulcer or an open serum
-filled blister

-- Stage III:  Full thickness skin loss involving damage or necrosis of subcutaneous tissue

-- Stage IV:  Full thickness skin loss with exposed bone, tendon or muscle

-- Unstageable:  Full thickness tissue loss in which the base of the ulcer is covered by slough and/o
r eschar in the wound bed



The patient's Clinical Indicators include:

---





Query created by: Yazmin Amado on 2/22/2019 10:31 AM





Electronically signed by:  Vijay Murillo 2/24/2019 11:48 AM

## 2019-02-24 NOTE — CP.PCM.DIS
Provider





- Provider


Date of Admission: 


02/15/19 13:46





Attending physician: 


Harsha Katz MD





Consults: 








02/15/19 13:16


Neuro Surgery Consult Stat 


   Comment: 


   Consulting Provider: Raghav Leach


   Consulting Physician: Raghav Leach


   Reason for Consult: subdural hematoma, 4mm midline shift





02/15/19 16:22


Nursing Referral for Wound Care Routine 


   Comment: 


   Physician Instructions: 


   Reason For Exam: abrasion to left elbow


Pastoral Care Referral Routine 


   Comment: 


   Physician Instructions: 


   Reason For Exam: requests





02/15/19 16:24


Case Management Referral Routine 


   Comment: 


   Physician Instructions: 


   Reason For Exam: 


   Reason for Referral: Discharge Planning





02/17/19 14:38


Cardiology Consult Routine 


   Comment: 


   Consulting Provider: Flory Lazcano


   Consulting Physician: Flory Lazcano


   Reason for Consult: cardiology clearance











Time Spent in preparation of Discharge (in minutes): 30





Diagnosis





- Discharge Diagnosis


(1) Subdural hematoma


Status: Acute   





(2) Hypertension


Status: Acute   Priority: Medium   





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


Status: Acute   Priority: Medium   





(4) Fall


Status: Acute   





(5) Depression


Status: Acute   





(6) Essential tremor


Status: Acute   Priority: High   





(7) Osteoarthritis


Status: Acute   





(8) Gait difficulty


Status: Acute   Priority: Medium   





Hospital Course





- Lab Results


Lab Results: 


                                  Micro Results





02/15/19 17:00   Naris   MRSA Culture (Admit) - Final


                            MRSA NOT DETECTED





                             Most Recent Lab Values











WBC  6.9 K/uL (4.8-10.8)   02/22/19  04:40    


 


RBC  4.46 Mil/uL (4.40-5.90)   02/22/19  04:40    


 


Hgb  14.0 g/dL (12.0-18.0)   02/22/19  04:40    


 


Hct  42.0 % (35.0-51.0)   02/22/19  04:40    


 


MCV  94.2 fl (80.0-94.0)  H  02/22/19  04:40    


 


MCH  31.5 pg (27.0-31.0)  H  02/22/19  04:40    


 


MCHC  33.4 g/dL (33.0-37.0)   02/22/19  04:40    


 


RDW  13.9 % (11.5-14.5)   02/22/19  04:40    


 


Plt Count  223 K/uL (130-400)   02/22/19  04:40    


 


MPV  9.4 fl (7.2-11.7)   02/16/19  05:30    


 


Neut % (Auto)  61.5 % (50.0-75.0)   02/16/19  05:30    


 


Lymph % (Auto)  27.1 % (20.0-40.0)   02/16/19  05:30    


 


Mono % (Auto)  6.9 % (0.0-10.0)   02/16/19  05:30    


 


Eos % (Auto)  4.0 % (0.0-4.0)   02/16/19  05:30    


 


Baso % (Auto)  0.5 % (0.0-2.0)   02/16/19  05:30    


 


Neut # (Auto)  3.7 K/uL (1.8-7.0)   02/16/19  05:30    


 


Lymph # (Auto)  1.6 K/uL (1.0-4.3)   02/16/19  05:30    


 


Mono # (Auto)  0.4 K/uL (0.0-0.8)   02/16/19  05:30    


 


Eos # (Auto)  0.2 K/uL (0.0-0.7)   02/16/19  05:30    


 


Baso # (Auto)  0.0 K/uL (0.0-0.2)   02/16/19  05:30    


 


Neutrophils % (Manual)  86 % (42-75)  H  02/15/19  11:15    


 


Band Neutrophils %  4 % (0-2)  H  02/15/19  11:15    


 


Lymphocytes % (Manual)  9 % (20-50)  L  02/15/19  11:15    


 


Monocytes % (Manual)  1 % (0-10)   02/15/19  11:15    


 


Platelet Estimate  Normal  (NORMAL)   02/15/19  11:15    


 


Large Platelets  Present   02/15/19  11:15    


 


Giant Platelets  Present   02/15/19  11:15    


 


Poikilocytosis (manual  Slight   02/15/19  11:15    


 


Anisocytosis (manual)  Slight   02/15/19  11:15    


 


[place] Cells     02/15/19  11:15    


 


Acanthocytes (Spur)  Np   02/15/19  11:15    


 


PT  11.6 Seconds (9.8-13.1)   02/18/19  06:00    


 


INR  1.0   02/18/19  06:00    


 


APTT  38.9 Seconds (25.6-37.1)  H  02/18/19  06:00    


 


pO2  62 mm/Hg (30-55)  H  02/15/19  12:55    


 


VBG pH  7.40  (7.32-7.43)   02/15/19  12:55    


 


VBG pCO2  42 mmHg (40-60)   02/15/19  12:55    


 


VBG HCO3  25.5 mmol/L  02/15/19  12:55    


 


VBG Total CO2  27.3 mmol/L (22-28)   02/15/19  12:55    


 


VBG O2 Sat (Calc)  92.9 % (40-65)  H  02/15/19  12:55    


 


VBG Base Excess  1.0 mmol/L (0.0-2.0)   02/15/19  12:55    


 


VBG Potassium  4.2 mmol/L (3.6-5.2)   02/15/19  12:55    


 


Sodium  141.0 mmol/L (132-148)   02/15/19  12:55    


 


Chloride  110.0 mmol/L ()  H  02/15/19  12:55    


 


Glucose  150 mg/dL ()  H  02/15/19  12:55    


 


Lactate  1.3 mmol/L (0.7-2.1)   02/15/19  12:55    


 


FiO2  21.0 %  02/15/19  12:55    


 


Sodium  135 mmol/l (132-148)   02/22/19  04:40    


 


Potassium  4.2 MMOL/L (3.6-5.0)   02/22/19  04:40    


 


Chloride  96 mmol/L ()  L  02/22/19  04:40    


 


Carbon Dioxide  27 mmol/L (22-30)   02/22/19  04:40    


 


Anion Gap  16  (10-20)   02/22/19  04:40    


 


BUN  16 mg/dl (9-20)   02/22/19  04:40    


 


Creatinine  0.7 mg/dl (0.8-1.5)  L  02/22/19  04:40    


 


Est GFR ( Amer)  > 60   02/22/19  04:40    


 


Est GFR (Non-Af Amer)  > 60   02/22/19  04:40    


 


POC Glucose (mg/dL)  132 mg/dL ()  H  02/22/19  17:04    


 


Random Glucose  170 mg/dL ()  H  02/22/19  04:40    


 


Calcium  9.1 mg/dL (8.4-10.2)   02/22/19  04:40    


 


Phosphorus  3.1 mg/dl (2.5-4.5)   02/16/19  05:30    


 


Magnesium  2.0 MG/DL (1.6-2.3)   02/16/19  05:30    


 


Total Bilirubin  0.6 mg/dl (0.2-1.3)   02/22/19  04:40    


 


AST  46 U/L (17-59)   02/22/19  04:40    


 


ALT  70 U/L (21-72)   02/22/19  04:40    


 


Alkaline Phosphatase  81 U/L ()   02/22/19  04:40    


 


Total Creatine Kinase  245 U/L ()  H  02/16/19  05:30    


 


Troponin I  < 0.0120 ng/mL (0.00-0.120)   02/15/19  11:15    


 


Total Protein  6.8 G/DL (6.3-8.2)   02/22/19  04:40    


 


Albumin  3.8 g/dL (3.5-5.0)   02/22/19  04:40    


 


Globulin  3.0 gm/dL (2.2-3.9)   02/22/19  04:40    


 


Albumin/Globulin Ratio  1.3  (1.0-2.1)   02/22/19  04:40    


 


Venous Blood Potassium  4.2 mmol/L (3.6-5.2)   02/15/19  12:55    


 


Blood Type  A POSITIVE   02/17/19  18:30    


 


Blood Type Confirm  A POSITIVE   02/17/19  05:30    


 


Antibody Screen  Negative   02/17/19  18:30    


 


BBK History Checked  No verified bt   02/17/19  18:30    














- Hospital Course


Hospital Course: 





This is a 74 y/o male with hx of DM 2, OA HTn depression admitted for subdural 

hematoma after a fall at home.


He was admitted to ICU and Neurosurgery evaluated the patient and performed 

evacuation of the hemorrhage. Patient tolerated well. He was noted to have 

tremors at the ICU and responded to low dose of Propranolol. 


Physical therapy was started and transferred to TCU for further rehab.





Discharge Exam





- Head Exam


Head Exam: NORMAL INSPECTION





- Eye Exam


Eye Exam: Normal appearance





- Respiratory Exam


Respiratory Exam: Clear to PA & Lateral





- Cardiovascular Exam


Cardiovascular Exam: REGULAR RHYTHM





- GI/Abdominal Exam


GI & Abdominal Exam: Normal Bowel Sounds





- Neurological Exam


Neurological exam: CN II-XII Intact, Oriented x3





- Psychiatric Exam


Psychiatric exam: Normal Mood





Discharge Plan





- Follow Up Plan


Condition: FAIR


Disposition: REHAB FACILITY/REHAB UNIT


Additional Instructions: 


transferred to TCU for further PT

## 2019-02-25 RX ADMIN — POLYETHYLENE GLYCOL 3350 SCH GM: 17 POWDER, FOR SOLUTION ORAL at 21:16

## 2019-02-25 NOTE — CP.PCM.PN
Subjective





- Date & Time of Evaluation


Date of Evaluation: 02/25/19


Time of Evaluation: 10:00





- Subjective


Subjective: 





patient seen and examined at bedside.


Interim events noted


No complaints offered at this time.


denies cp/sob/fever/chills.


available diagnostic data reviewed





Review of Systems All systems: reviewed and no additional remarkable complaints 

except mentioned above





Objective





Vital Signs Stable


- Constitutional


Appears: Non-toxic, No Acute Distress





Head Exam: NORMAL INSPECTION, staples of right head c/d/i





Eye Exam: Normal appearance





Respiratory Exam: NORMAL BREATHING PATTERN





Cardiovascular Exam: +S1, +S2





GI & Abdominal Exam: Soft





Neurological Exam: Alert, Awake





Psychiatric exam: Normal Affect, Normal Mood





Skin Exam: Normal Color, Warm





Assessment and Plan





monitor vitals


monitor labs


Cont meds


Cont therapy


rest of plan as ordered








Assessment and Plan


(1) Subdural hematoma


Status: Acute   





(2) Fall


Status: Acute

## 2019-02-25 NOTE — CP.PCM.PN
Subjective





- Date & Time of Evaluation


Date of Evaluation: 02/25/19


Time of Evaluation: 17:47





- Subjective


Subjective: 





Patient seen in the room


doing ok


denies sob/cp


denies fever


staples CDI


continue current care





Objective





- Vital Signs/Intake and Output


Vital Signs (last 24 hours): 


                                        











Temp Pulse Resp BP Pulse Ox


 


 97.7 F   67   18   112/64   95 


 


 02/25/19 07:40  02/25/19 16:54  02/25/19 07:40  02/25/19 16:54  02/25/19 14:17











- Medications


Medications: 


                               Current Medications





Acetaminophen (Tylenol 325mg Tab)  650 mg PO Q4 PRN


   PRN Reason: pain scale (1-7)


Acetaminophen (Tylenol 325mg Tab)  650 mg PO Q6 PRN


   PRN Reason: Headache


Atorvastatin Calcium (Lipitor)  20 mg PO [facility]ILY [facility]


   Last Admin: 02/25/19 08:19 Dose:  20 mg


Cyanocobalamin (Vitamin B12 1000 Mcg Tab)  1,000 mcg PO DAILY [facility]


   Last Admin: 02/25/19 08:18 Dose:  1,000 mcg


Insulin Human Regular (Humulin R)  0 units SC [facility]; Protocol


   Last Admin: 02/25/19 16:54 Dose:  4 units


Polyethylene Glycol (Miralax)  17 gm PO HS [facility]


   Last Admin: 02/24/19 21:49 Dose:  17 gm


Propranolol HCl (Inderal)  10 mg PO TID [facility]


   Last Admin: 02/25/19 16:54 Dose:  10 mg


Ramipril (Altace)  2.5 mg PO DAILY [facility]


   Last Admin: 02/25/19 08:19 Dose:  2.5 mg


Sertraline HCl (Zoloft)  100 mg PO DAILY [facility]


   Last Admin: 02/25/19 08:19 Dose:  100 mg











- Labs


Labs: 


                                        





                                 02/24/19 05:30 





                                 02/24/19 05:30 





                                        











PT  12.8 Seconds (9.8-13.1)   02/24/19  05:30    


 


INR  1.1   02/24/19  05:30

## 2019-02-26 RX ADMIN — POLYETHYLENE GLYCOL 3350 SCH GM: 17 POWDER, FOR SOLUTION ORAL at 21:09

## 2019-02-26 NOTE — PCM.PSYTMC
Acute Rehab Team Conference -





- Vital Signs:


Vital Signs (Last 8 Hours): 


                                   Vital Signs











  02/26/19 02/26/19 02/26/19





  07:34 08:40 08:41


 


Temperature 96.8 F L  


 


Pulse Rate 65  69


 


Respiratory 20  





Rate   


 


Blood Pressure 101/67 122/65 122/65


 


O2 Sat by Pulse 96  





Oximetry   














  02/26/19 02/26/19





  09:00 12:34


 


Temperature 96.8 F L 


 


Pulse Rate 65 64


 


Respiratory 20 





Rate  


 


Blood Pressure 122/65 117/60


 


O2 Sat by Pulse  





Oximetry  











Pain: 0





- Precautions:


Precautions: Fall Prevention, Aspiration





- Medications/Other Issues:


Comment: with periods of incontinent denies any pain





- Consults:


Comment: Dr Del Angel





- Skin:


Incision Site: Rt Prontoparietal area


Dressing Status: Clean, Dry, Intact


Incision: Staples Intact


Incision Line Treatment: cleanse with ns open to air daily





- Toileting:


Toileting: Maximal Assistance





- Bladder Management:


Bladder Pattern: Normal, Incontinent


Voiding Method: Urinal, Diaper


Bladder Management: Moderate Assistance





- Transfers:


Transfers: Moderate Assistance





- ADL's:


ADL's: Moderate Assistance





- Pain Management:


Other Intervention:: tylenol for pain





- Patient/Family Teaching:


Other Intervention:: incisional line care ,medication teachings safety fall 

precautions





- Goals/Time Frame:


Comment: as per multidiciplinary plan of care





- Provider:


Registered Nurse:: Cintia Santiago





Physical Therapy





- Bed Mobility


Bed Mobility: Verbal Cues, Moderate Assistance


Comment: bed mob log rolling method.  mod vc/tc and PT demo needed for pt to 

performed tasks due to cog impairment (difficulty following commands even w/ use

of .)





- Transfers


Wheelchair to Mat: Verbal Cues, Maximum Assistance


Sit to Stand: Verbal Cues, Moderate Assistance, Maximum Assistance


Comment: Incr'd extensor tone at trunk and B hips tends to incr during sit to 

stand t/f.  mod vc/tc and PT demo needed for pt to performed tasks due to cog 

impairment (difficulty following commands even w/ use of .).  

difficulty advancing LUE during all t/f, assist needed; max vc/tc to keep R hand

on support surface not RW during sit to stand t/f.  Improved scooting in bed 

noted when pt assisted into bridge position. (min A).  Greater difficulty noted 

w/ scooting backwards in w/c (mod A/max A).  Improvement noted w/ leg rest 

donned (min A).  Pt tends to lean posteriorly upon standing. He is aware of 

this, but is unable to correct w/o assist





- Ambulation


Level of Assistance: Verbal Cues, Moderate Assistance


Distance (ft.): 115


Assistive Devices: Rolling Walker


Comment: x3 w/ w/c follow.  Pt w/ posterior lean during task, improved as task 

progressed.  varying DANIA (primarily wide), initial contact B foot flat, less 

difficulty advancing LLE today, incr'd lateral sway, decr'd stance time LLE, 

difficulty obtaining full L knee ext during mid stance, decr'd B step/stride 

length (but improved), unsteady gait, impaired coordination noted; unsteady w/ 

turns and obstacles.  step length and fluidity of gait improved as task 

progressed





- Stair Negotiation


Stairs: Level of Assistance: Verbal Cues, Maximum Assistance


Number of Stairs: 3


Stairs: Assistive Devices: Left Handrail, Right Handrail


Comment: 4 in steps w/ mod/max A x 1 and SBA x1, step to pattern.  max A needed 

to wt shift onto R side during LLE advancement, robyn during asc; assist needed to

advance LLE at times, robyn during asc. Pt w/ very narrow DANIA, mod A needed for wt

shift and to advance RLE to improve DANIA.  Post LOB occurred when turning at top 

of stairs due to difficulty keeping BLE apart when turning, min A x 2 needed to 

correct positioning





- Standing Balance


Static Stand: Moderate Assistance


Comment: w/ RW





- Pain


Pain (assessed during therapy session): 0


Comment: N/A





- Insight/Carryover


Insight/Carryover: Fair





- Patient/Family Education


Comment: deleterious effect of prolonged bed rest, DBE, SDH recovery topics, PT 

goals, POC, fxnl mob, falls, safety, balance, posture,





- Assessment/Plan


Assessment: 74 yo male admitted to [facility] for acute rehab s/p acute on chronic SDH

w/ crani and evac s/p fall. Pt displays significant cog impairments and neuroms 

deficits. Pt currently requires mod/max assist for all fxnl mob and ambulatory 

activities. Cont'd skilled acute rehab PT recommended. Extended acute rehab stay

recommended to maximize fxnl outcomes.





- Goals


Timeframe: 4 weeks


Goals: mod I 150' w/c prop.  1 flight 8 in steps R HR CS, step to pattern.  RW 

300' DS.  sit to stand DS.  SPT DS.  bed mob mod I





- Provider


Physical Therapist:: Glory Tam


License Number:: [id number]





Occupational Therapy





- Arousal/Attention/Orientation


Level of Consciousness: Awake, Alert, Forgetful, Disoriented


Patient Orientation: Person, Place, Time, Appropriate to Age, Appropriate to 

Situation, Disoriented


Assessment Comment: varies





- ADL/IADL


Self Feeding: Supervision, Verbal Cues, Set-up Help


Grooming: Verbal Cues, Set-up Help, Minimal Assistance


Dressing-Upper Ext: Verbal Cues, Set-up Help, Moderate Assistance


Dressing-Lower Ext: Verbal Cues, Set-up Help, Maximum Assistance





- Sitting Balance


Static Sitting: Minimal Assistance


Dynamic Sitting: Reaches across midline, Reaches out of base of support, Reaches

within base of support, Requires supervision, Minimal Assistance, Moderate Hilario

tance





- Transfers


Wheelchair to Bed Transfers: Verbal Cues, Set-up Help, Moderate Assistance


Toilet Transfers: Verbal Cues, Set-up Help, Minimal Assistance, Moderate 

Assistance





- Upper Extremity Status


Right Upper Extremity Comment: AROM is WFLS


Left Upper Extremity Comment: AROM is WFLS, strength 3+/% --poor 

sensation/proprioception





- Pain


Pain (assessed during therapy session): 0





- Insight/Carryover


Insight/Carryover: Fair





- Patient/Family Education


Comment: -initiated for adl, transfer/mobility training using compensatory 

strategies--further training needed to increase carryover.  -LUE AROM 

activities.  -postural control--sitting/standing.  -rehab/OT goals, plan of 

care.  -toileting program.  -safety measures, fall prevention: bed and w/c sea

tbelt alarm





- Assessment/Plan


Assessment: Pt is a 73 year old R handed  Comoran speaking  with dx: subdural 

hematoma. *Precautions: falls, impaired cognition, L sided weakness, impaired 

proprioception/body awareness, impaired sensation/proprioception LUE, w/c and 

bed alarms.  Pt limited by the following impairments:  -impaired cognition-

memory, insight.  -impaired standing/sitting balance & tolerance.  -impaired 

activity tolerance.  -impaired safety awareness.  -impaired proprioception 

LUE/body.  -impaired AROM/strength in LUE/LLE and trunk.  -impaired postural 

control--which impact on self care, transfers/mobility and Iadls.  Pt will 

continue skilled Occupational Therapy to address above areas to maximize 

function in self care using adaptive/comepnsatory strategies.  Pt will benefit 

from toileting program to minimize bouts of incontinence.  *Goal: 24/7 

Supervision for adls/transfers/mobility or TANNA pending progress





- Goals


Timeframe: 8 days


Comment: -FEEDING: Supervision/setup.  -GROOMING: Supervision/setup verbal cues.

 -UPPER BODY DRESSING: Min assist and verbal cues camilo-techniques.  -LOWER BODY 

DRESSING: mod assist and verbal cues camilo-technies.  -TOILETING: min assist and 

verbal cues.  -TRANSFERS:<->bed, commode, w/c and other surfaces with Min assist

and verbal cues.  -LUE STRENGTH/AWARENESS: increase to 4-/5  & awareness as 

needed to complete adls.  -W/C MANAGEMENT: min assist &  verbal cues to manage 

brakes, propel w/c 50-75 feet





- Provider


Occupational Therapist:: Teresa Barajas


License Number: [id number]





Speech Therapy





- Consult Information


Patient on Program: Yes


Medical Diagnosis: R SDH


Treatment Diagnosis: moderate cognitive-linguistic deficits





- Assessment


Problem Solving Impairment: Moderate


Memory Impairment: Moderate





- Plan


Assessment: Star Burnett presents with moderate cognitive-linguistic deficits 

characterized by impaired orientation, immediate and short-term recall, problem 

solving, reasoning, sequencing, thought organization, and insight all negatively

impacting pt's safety and functional independence. Pt lived alone prior to 

admission to the hospital. He would benefit from skilled speech tx for improved 

cognitive skills, safety, and functional independence. Assessment was completed 

using [employer] ( [id number]).


Plan: Continue Speech/Language Therapy


Frequency: 3-5 times per week


Duration: 1 week


Goals/Timeframe: Please see IE completed 2/25/19 for complete goals/POC


Recommendations: ST 3-5x/week for improved cognition and functional independence





- Provider


Therapist: Jasmin Jensen


License Number: [id number]





Recreational Therapy





- Participation


Participation: Participates in Individual and/or Group Sessions, Monitors 

His/Her Own Leisure Time





- Activities


Leisure Activities: Television





- Socialization


Level of Socialization: Initiates/interacts freely with care givers and peer





- Assessment


Assessment/Plan: Pt was oriented to the benefits and purpose of participating in

recreation therapy sessions offered throughout stay on unit. Pt expressed i

nterest in participating in leisure tasks related to improving UE strength and 

fine motor skills. Pt will be encouraged to participate in sessions throughout 

stay on unit.


Problems Currently Limiting Participation: L UE weakness, impaired problem 

solving, decrease leisure awareness level


Goals and Time Frame: Pt will tolerate 20 minutes of completing tabletop leisure

task utilizing both hands to improve leisure awareness level and strength in UE 

by date of discharge.





- Provider


Therapist: Morena Zamarripa





Nutrition





- Current Diet


Current Diet/Supplement/Feedings: Moderate consistent CHO heart healthy diet





- Appetite


Percent Meal Consumed: 50-74%





- Assessment/Goals/Time Frame


Assessments/Goals/Time Frame: Pt at moderate nutritional risk.  goals: 1. 

Improve PO intake to greater than 75% at mealtime.  2. Maintain good glycemic 

control:GLU:140-180mg/dl.  3. Monitor need for PO supplement at next nutrition 

reassessment.  Follow-up due on   03/03/2019





- Provider


Provider: Maureen Reilly





Case Management





- Psychosocial Assessment


Support Systems: Star Burnett ([facility]) - [phone]


Psychological Interventions/Needs: Patient is AAO with some forgetfulness and 

confusion. Baseline for pt's memory to be confirmed with family.


Discharge Concerns: Patient lives alone in an apartment building with elevator 

access.


Patient/Family Meeting: CM met with patient and rehab team.


Intervention/Goal/Outcome: 1. Goal: Supervision? 2. Plan: home with VNS and 

family support vs TANNA 3. DME needs 4. f/u appts 5. continued emotional support





- Discharge Plan


Discharge Plan: Home with services


Home Services: [facility] and long term PCA services?





- Provider


Provider: Nissa Corey


License Number: [id number]





Rehabilitation Plan





- Treatment Plan


Treatment Plan: Physical Therapy, Occupational Therapy, Speech, Dietary, 

Patient/Family Education





- Discharge Plan


Estimated Date of Discharge: 03/12/19


Discharge to: Subacute

## 2019-02-26 NOTE — CP.PCM.PN
Subjective





- Date & Time of Evaluation


Date of Evaluation: 02/26/19


Time of Evaluation: 11:00





- Subjective


Subjective: 





Patient is doing well with Phys therapy.


Has no chest pain or SOB.


No headaches 








Objective





- Vital Signs/Intake and Output


Vital Signs (last 24 hours): 


                                        











Temp Pulse Resp BP Pulse Ox


 


 96.8 F L  66   20   112/64   97 


 


 02/26/19 09:00  02/26/19 17:19  02/26/19 09:00  02/26/19 17:19  02/26/19 08:58











- Medications


Medications: 


                               Current Medications





Acetaminophen (Tylenol 325mg Tab)  650 mg PO Q6 PRN


   PRN Reason: Headache


Acetaminophen (Tylenol 325mg Tab)  650 mg PO Q4 PRN


   PRN Reason: Pain scale 1-10.


Atorvastatin Calcium (Lipitor)  20 mg PO DAILY [facility]


   Last Admin: 02/26/19 08:42 Dose:  20 mg


Cyanocobalamin (Vitamin B12 1000 Mcg Tab)  1,000 mcg PO DAILY [facility]


   Last Admin: 02/26/19 08:42 Dose:  1,000 mcg


Insulin Human Regular (Humulin R)  0 units SC [facility]; Protocol


   Last Admin: 02/26/19 17:19 Dose:  Not Given


Polyethylene Glycol (Miralax)  17 gm PO HS [facility]


   Last Admin: 02/25/19 21:16 Dose:  17 gm


Propranolol HCl (Inderal)  10 mg PO TID [facility]


   Last Admin: 02/26/19 17:19 Dose:  10 mg


Ramipril (Altace)  2.5 mg PO DAILY [facility]


   Last Admin: 02/26/19 08:40 Dose:  2.5 mg


Sertraline HCl (Zoloft)  100 mg PO DAILY [facility]


   Last Admin: 02/26/19 08:41 Dose:  100 mg











- Labs


Labs: 


                                        





                                 02/24/19 05:30 





                                 02/24/19 05:30 





                                        











PT  12.8 Seconds (9.8-13.1)   02/24/19  05:30    


 


INR  1.1   02/24/19  05:30    














- Head Exam


Head Exam: NORMAL INSPECTION





- Eye Exam


Eye Exam: Normal appearance





- ENT Exam


ENT Exam: Mucous Membranes Moist





- Respiratory Exam


Respiratory Exam: Clear to Ausculation Bilateral





- Cardiovascular Exam


Cardiovascular Exam: REGULAR RHYTHM





- GI/Abdominal Exam


GI & Abdominal Exam: Normal Bowel Sounds





- Neurological Exam


Neurological Exam: Awake, Oriented x3





- Psychiatric Exam


Psychiatric exam: Normal Mood





Assessment and Plan


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


Status: Acute   





(2) Essential tremor


Status: Acute   





(3) Hypertension


Status: Acute   





(4) Subdural hematoma


Status: Acute   





- Assessment and Plan (Free Text)


Plan: 





Cont meds


Cont tx


Cont PT


will remove staples at 14th day post op

## 2019-02-26 NOTE — CP.PCM.PN
Subjective





- Date & Time of Evaluation


Date of Evaluation: 02/26/19


Time of Evaluation: 13:48





- Subjective


Subjective: 





denie sob/cp


denies HA or dizziness


notes that he is not able to take care of himself safely at home at this point








Objective





- Vital Signs/Intake and Output


Vital Signs (last 24 hours): 


                                        











Temp Pulse Resp BP Pulse Ox


 


 96.8 F L  64   20   117/60   96 


 


 02/26/19 09:00  02/26/19 12:34  02/26/19 09:00  02/26/19 12:34  02/26/19 07:34











- Medications


Medications: 


                               Current Medications





Acetaminophen (Tylenol 325mg Tab)  650 mg PO Q6 PRN


   PRN Reason: Headache


Acetaminophen (Tylenol 325mg Tab)  650 mg PO Q4 PRN


   PRN Reason: Pain scale 1-10.


Atorvastatin Calcium (Lipitor)  20 mg PO DAILY [facility]


   Last Admin: 02/26/19 08:42 Dose:  20 mg


Cyanocobalamin (Vitamin B12 1000 Mcg Tab)  1,000 mcg PO DAILY [facility]


   Last Admin: 02/26/19 08:42 Dose:  1,000 mcg


Insulin Human Regular (Humulin R)  0 units SC [facility]; Protocol


   Last Admin: 02/26/19 12:27 Dose:  3 units


Polyethylene Glycol (Miralax)  17 gm PO HS [facility]


   Last Admin: 02/25/19 21:16 Dose:  17 gm


Propranolol HCl (Inderal)  10 mg PO TID [facility]


   Last Admin: 02/26/19 12:34 Dose:  10 mg


Ramipril (Altace)  2.5 mg PO DAILY [facility]


   Last Admin: 02/26/19 08:40 Dose:  2.5 mg


Sertraline HCl (Zoloft)  100 mg PO DAILY [facility]


   Last Admin: 02/26/19 08:41 Dose:  100 mg











- Labs


Labs: 


                                        





                                 02/24/19 05:30 





                                 02/24/19 05:30 





                                        











PT  12.8 Seconds (9.8-13.1)   02/24/19  05:30    


 


INR  1.1   02/24/19  05:30    














- Constitutional


Appears: Non-toxic, No Acute Distress





- Head Exam


Head Exam: absent: ATRAUMATIC (has right scalp staples in place)





- ENT Exam


ENT Exam: Mucous Membranes Moist





- Respiratory Exam


Respiratory Exam: NORMAL BREATHING PATTERN





- GI/Abdominal Exam


GI & Abdominal Exam: absent: Distended





- Extremities Exam


Extremities Exam: absent: Pedal Edema





- Neurological Exam


Neurological Exam: Alert, CN II-XII Intact





- Psychiatric Exam


Psychiatric exam: Normal Affect, Normal Mood





Assessment and Plan





- Assessment and Plan (Free Text)


Assessment: 





73 year old with fall and SDH


s/p craniotomy


stable but not yet safe to d/c home


PT/OT to continue to help increase functional independence


Team conference for d/c planning. planned d/c to [facility] unless makes exceptional 

strides in the next week


Pain: controlled   


Vascular: no evidence of DVT


GI: No evidence of constipation or diarrhea








Patient is an excellent acute rehabilitation candidate and will have focused 

pain management, wound care, PT, OT and recreational therapy to help facilitate 

a safe and appropriate d/c plan

## 2019-02-27 RX ADMIN — POLYETHYLENE GLYCOL 3350 SCH GM: 17 POWDER, FOR SOLUTION ORAL at 21:11

## 2019-02-27 NOTE — CP.PCM.PN
Subjective





- Date & Time of Evaluation


Date of Evaluation: 02/27/19


Time of Evaluation: 19:23





- Subjective


Subjective: 





Patient seen in the room


doing ok


denies pain or HA


no dizziness


continue current care


staples CDI





Objective





- Vital Signs/Intake and Output


Vital Signs (last 24 hours): 


                                        











Temp Pulse Resp BP Pulse Ox


 


 98.1 F   63   21   112/58 L  97 


 


 02/27/19 08:03  02/27/19 16:32  02/27/19 08:03  02/27/19 16:32  02/27/19 08:39











- Medications


Medications: 


                               Current Medications





Acetaminophen (Tylenol 325mg Tab)  650 mg PO Q6 PRN


   PRN Reason: Headache


Acetaminophen (Tylenol 325mg Tab)  650 mg PO Q4 PRN


   PRN Reason: Pain scale 1-10.


Atorvastatin Calcium (Lipitor)  20 mg PO DAILY [facility]


   Last Admin: 02/27/19 09:05 Dose:  20 mg


Cyanocobalamin (Vitamin B12 1000 Mcg Tab)  1,000 mcg PO DAILY [facility]


   Last Admin: 02/27/19 09:05 Dose:  1,000 mcg


Insulin Human Regular (Humulin R)  0 units SC [facility]; Protocol


   Last Admin: 02/27/19 16:13 Dose:  Not Given


Polyethylene Glycol (Miralax)  17 gm PO HS [facility]


   Last Admin: 02/26/19 21:09 Dose:  17 gm


Propranolol HCl (Inderal)  10 mg PO TID [facility]


   Last Admin: 02/27/19 16:32 Dose:  10 mg


Ramipril (Altace)  2.5 mg PO DAILY [facility]


   Last Admin: 02/27/19 09:04 Dose:  2.5 mg


Sertraline HCl (Zoloft)  100 mg PO DAILY [facility]


   Last Admin: 02/27/19 09:05 Dose:  100 mg











- Labs


Labs: 


                                        





                                 02/24/19 05:30 





                                 02/24/19 05:30 





                                        











PT  12.8 Seconds (9.8-13.1)   02/24/19  05:30    


 


INR  1.1   02/24/19  05:30

## 2019-02-28 RX ADMIN — POLYETHYLENE GLYCOL 3350 SCH GM: 17 POWDER, FOR SOLUTION ORAL at 21:07

## 2019-02-28 NOTE — CP.PCM.PN
Subjective





- Date & Time of Evaluation


Date of Evaluation: 02/28/19


Time of Evaluation: 10:00





- Subjective


Subjective: 





patient seen and examined at bedside.


Interim events noted


No complaints offered at this time


denies cp/sob/fever/chills.


available diagnostic data reviewed





Review of Systems All systems: reviewed and no additional remarkable complaints 

except mentioned above





Objective





Vital Signs Stable


- Constitutional


Appears: Non-toxic, No Acute Distress





Head Exam: NORMAL INSPECTION, staples of right scalp, c/d/i





Eye Exam: Normal appearance





Respiratory Exam: NORMAL BREATHING PATTERN





Cardiovascular Exam: +S1, +S2





GI & Abdominal Exam: Soft





Neurological Exam: Alert, Awake





Psychiatric exam: Normal Affect, Normal Mood





Skin Exam: Normal Color, Warm





Assessment and Plan





monitor vitals


monitor labs


Cont meds


Cont therapy


consultants appreciated input


rest of plan as ordered








Assessment and Plan


(1) Subdural hematoma


Status: Acute   





(2) Fall


Status: Acute

## 2019-03-01 RX ADMIN — POLYETHYLENE GLYCOL 3350 SCH GM: 17 POWDER, FOR SOLUTION ORAL at 21:07

## 2019-03-01 NOTE — CP.PCM.PN
Subjective





- Date & Time of Evaluation


Date of Evaluation: 03/01/19


Time of Evaluation: 17:58





- Subjective


Subjective: 





Patient seen in the room


comfortable


staples CDI


has some scabbing and to prepare d/c of staples next week will have the staff 

clean and rub the scabs off to allow for better approximation





Objective





- Vital Signs/Intake and Output


Vital Signs (last 24 hours): 


                                        











Temp Pulse Resp BP Pulse Ox


 


 97.6 F   62   19   113/62   99 


 


 03/01/19 10:00  03/01/19 17:08  03/01/19 10:00  03/01/19 17:08  03/01/19 10:00











- Medications


Medications: 


                               Current Medications





Acetaminophen (Tylenol 325mg Tab)  650 mg PO Q6 PRN


   PRN Reason: Headache


Acetaminophen (Tylenol 325mg Tab)  650 mg PO Q4 PRN


   PRN Reason: Pain scale 1-10.


Atorvastatin Calcium (Lipitor)  20 mg PO DAILY [facility]


   Last Admin: 03/01/19 09:02 Dose:  20 mg


Cyanocobalamin (Vitamin B12 1000 Mcg Tab)  1,000 mcg PO DAILY [facility]


   Last Admin: 03/01/19 09:02 Dose:  1,000 mcg


Insulin Human Regular (Humulin R)  0 units SC [facility]; Protocol


   Last Admin: 03/01/19 16:58 Dose:  Not Given


Polyethylene Glycol (Miralax)  17 gm PO HS [facility]


   Last Admin: 02/28/19 21:07 Dose:  17 gm


Propranolol HCl (Inderal)  10 mg PO TID [facility]


   Last Admin: 03/01/19 17:08 Dose:  10 mg


Ramipril (Altace)  2.5 mg PO DAILY [facility]


   Last Admin: 03/01/19 09:03 Dose:  2.5 mg


Sertraline HCl (Zoloft)  100 mg PO DAILY [facility]


   Last Admin: 03/01/19 09:01 Dose:  100 mg











- Labs


Labs: 


                                        





                                 02/24/19 05:30 





                                 02/24/19 05:30 





                                        











PT  12.8 Seconds (9.8-13.1)   02/24/19  05:30    


 


INR  1.1   02/24/19  05:30

## 2019-03-02 RX ADMIN — POLYETHYLENE GLYCOL 3350 SCH GM: 17 POWDER, FOR SOLUTION ORAL at 22:02

## 2019-03-03 RX ADMIN — POLYETHYLENE GLYCOL 3350 SCH GM: 17 POWDER, FOR SOLUTION ORAL at 21:29

## 2019-03-04 RX ADMIN — POLYETHYLENE GLYCOL 3350 SCH GM: 17 POWDER, FOR SOLUTION ORAL at 21:18

## 2019-03-04 NOTE — CP.PCM.PN
Subjective





- Date & Time of Evaluation


Date of Evaluation: 02/27/19


Time of Evaluation: 08:45





- Subjective


Subjective: 





Pt seen and assessed at bedside. Staples on head s/p hematoma evac are clean, 

with no signs of infection. Strength improving. Resting left arm tremor noted; 

pt on inderal. 





Review of Systems





- Review of Systems


All systems: reviewed and no additional remarkable complaints except (tremor 

Left upper extremity.)





Objective





- Vital Signs/Intake and Output


Vital Signs (last 24 hours): 


                                        











Temp Pulse Resp BP Pulse Ox


 


 98.6 F   66   20   118/69   96 


 


 03/03/19 20:25  03/03/19 20:25  03/03/19 20:25  03/03/19 20:25  03/03/19 20:25











- Medications


Medications: 


                               Current Medications





Acetaminophen (Tylenol 325mg Tab)  650 mg PO Q6 PRN


   PRN Reason: Headache


Acetaminophen (Tylenol 325mg Tab)  650 mg PO Q4 PRN


   PRN Reason: Pain scale 1-10.


Atorvastatin Calcium (Lipitor)  20 mg PO DAILY [facility]


   Last Admin: 03/03/19 08:38 Dose:  20 mg


Cyanocobalamin (Vitamin B12 1000 Mcg Tab)  1,000 mcg PO DAILY [facility]


   Last Admin: 03/03/19 08:38 Dose:  1,000 mcg


Insulin Human Regular (Humulin R)  0 units SC [facility]; Protocol


   Last Admin: 03/03/19 21:29 Dose:  Not Given


Polyethylene Glycol (Miralax)  17 gm PO HS [facility]


   Last Admin: 03/03/19 21:29 Dose:  17 gm


Propranolol HCl (Inderal)  10 mg PO TID [facility]


   Last Admin: 03/03/19 16:33 Dose:  10 mg


Ramipril (Altace)  2.5 mg PO DAILY [facility]


   Last Admin: 03/03/19 08:37 Dose:  2.5 mg


Sertraline HCl (Zoloft)  100 mg PO DAILY [facility]


   Last Admin: 03/03/19 08:38 Dose:  100 mg











- Labs


Labs: 


                                        





                                 02/24/19 05:30 





                                 02/24/19 05:30 





                                        











PT  12.8 Seconds (9.8-13.1)   02/24/19  05:30    


 


INR  1.1   02/24/19  05:30    














- Head Exam


Head Exam: NORMOCEPHALIC


Additional comments: 





staples to the top of the head, toward the right side. Site is clean, dry, and 

intact. 





- Eye Exam


Eye Exam: EOMI, Normal appearance, PERRL


Pupil Exam: NORMAL ACCOMODATION, PERRL





- ENT Exam


ENT Exam: Mucous Membranes Moist





- Neck Exam


Neck Exam: Normal Inspection





- Respiratory Exam


Respiratory Exam: Clear to Ausculation Bilateral





- Cardiovascular Exam


Cardiovascular Exam: REGULAR RHYTHM, +S1, +S2





- GI/Abdominal Exam


GI & Abdominal Exam: Soft, Normal Bowel Sounds





- Extremities Exam


Extremities Exam: Normal Capillary Refill


Additional comments: 





mild left sided weakness.





- Back Exam


Back Exam: NORMAL INSPECTION





- Neurological Exam


Neurological Exam: Alert, Awake


Neuro motor strength exam: Left Upper Extremity: 4, Right Upper Extremity: 5, 

Left Lower Extremity: 4, Right Lower Extremity: 5





- Psychiatric Exam


Psychiatric exam: Normal Affect, Normal Mood





- Skin


Skin Exam: Dry, Normal Color, Warm





Assessment and Plan


(1) Subdural hematoma


Assessment & Plan: 


1.) Subdural Hematoma





-strength improving; mild Left sided weakness noted.


-continue physical therapy/rehab.


-safety and fall precautions.


-left arm tremor noted, pt being treated with inderal. 


-consults input appreciated.


-continue current tx. 


-head staples to be removed within 14 days post-op.


Status: Acute

## 2019-03-04 NOTE — CP.PCM.PN
Subjective





- Date & Time of Evaluation


Date of Evaluation: 03/03/19


Time of Evaluation: 13:00





- Subjective


Subjective: 








Pt seen and assessed in chair. No new complaints, strength improving. Right 

frontoparietal staples intact. 





Review of Systems





- Review of Systems


All systems: reviewed and no additional remarkable complaints except (left arm 

tremor)





- Constitutional


Constitutional: Weakness





Objective





- Vital Signs/Intake and Output


Vital Signs (last 24 hours): 


                                        











Temp Pulse Resp BP Pulse Ox


 


 98.6 F   61   21   102/55 L  96 


 


 03/04/19 07:57  03/04/19 16:42  03/04/19 07:57  03/04/19 16:42  03/04/19 07:57











- Medications


Medications: 


                               Current Medications





Acetaminophen (Tylenol 325mg Tab)  650 mg PO Q6 PRN


   PRN Reason: Headache


Acetaminophen (Tylenol 325mg Tab)  650 mg PO Q4 PRN


   PRN Reason: Pain scale 1-10.


Atorvastatin Calcium (Lipitor)  20 mg PO DAILY [facility]


   Last Admin: 03/04/19 08:53 Dose:  20 mg


Cyanocobalamin (Vitamin B12 1000 Mcg Tab)  1,000 mcg PO DAILY [facility]


   Last Admin: 03/04/19 08:53 Dose:  1,000 mcg


Insulin Human Regular (Humulin R)  0 units SC [facility]; Protocol


   Last Admin: 03/04/19 16:42 Dose:  Not Given


Polyethylene Glycol (Miralax)  17 gm PO HS [facility]


   Last Admin: 03/03/19 21:29 Dose:  17 gm


Propranolol HCl (Inderal)  10 mg PO TID [facility]


   Last Admin: 03/04/19 16:42 Dose:  10 mg


Ramipril (Altace)  2.5 mg PO DAILY [facility]


   Last Admin: 03/04/19 08:52 Dose:  2.5 mg


Sertraline HCl (Zoloft)  100 mg PO DAILY [facility]


   Last Admin: 03/04/19 08:53 Dose:  100 mg











- Labs


Labs: 


                                        





                                 02/24/19 05:30 





                                 02/24/19 05:30 





                                        











PT  12.8 Seconds (9.8-13.1)   02/24/19  05:30    


 


INR  1.1   02/24/19  05:30    














- Head Exam


Head Exam: NORMAL INSPECTION, NORMOCEPHALIC


Additional comments: 





right frontoparietal staples intact. 





- Eye Exam


Eye Exam: EOMI, Normal appearance, PERRL


Pupil Exam: NORMAL ACCOMODATION, PERRL





- ENT Exam


ENT Exam: Mucous Membranes Moist





- Neck Exam


Neck Exam: Normal Inspection





- Respiratory Exam


Respiratory Exam: Clear to Ausculation Bilateral





- Cardiovascular Exam


Cardiovascular Exam: REGULAR RHYTHM, +S1, +S2





- GI/Abdominal Exam


GI & Abdominal Exam: Soft, Normal Bowel Sounds





- Extremities Exam


Extremities Exam: Normal Capillary Refill


Additional comments: 





left arm tremor.





- Back Exam


Back Exam: NORMAL INSPECTION





- Neurological Exam


Neurological Exam: Alert, Awake


Neuro motor strength exam: Left Upper Extremity: 4, Right Upper Extremity: 5, 

Left Lower Extremity: 4, Right Lower Extremity: 5





- Psychiatric Exam


Psychiatric exam: Normal Affect, Normal Mood





- Skin


Skin Exam: Dry, Normal Color, Warm





Assessment and Plan


(1) Subdural hematoma


Assessment & Plan: 


1.) Subdural Hematoma





-continue current tx.


-Continue improving strength (PT/OT).


-Maintain neuro checks.


-safety precautions. 


 





Status: Acute

## 2019-03-04 NOTE — CP.PCM.PN
Subjective





- Date & Time of Evaluation


Date of Evaluation: 03/04/19


Time of Evaluation: 11:00





- Subjective


Subjective: 





patient seen and examined at bedside.


Interim events noted


No complaints offered at this time


denies cp/sob/fever/chills.


available diagnostic data reviewed





Review of Systems All systems: reviewed and no additional remarkable complaints 

except mentioned above





Objective





Vital Signs Stable


- Constitutional


Appears: Non-toxic, No Acute Distress





Head Exam: NORMAL INSPECTION, staples of right scalp, c/d/i





Eye Exam: Normal appearance





Respiratory Exam: NORMAL BREATHING PATTERN





Cardiovascular Exam: +S1, +S2





GI & Abdominal Exam: Soft





Neurological Exam: Alert, Awake





Psychiatric exam: Normal Affect, Normal Mood





Skin Exam: Normal Color, Warm





Assessment and Plan





monitor vitals


monitor labs


Cont meds


Cont therapy


consultants appreciated input


contact Neurosurg re: staple removal


rest of plan as ordered





Objective





- Vital Signs/Intake and Output


Vital Signs (last 24 hours): 


                                        











Temp Pulse Resp BP Pulse Ox


 


 98.6 F   61   21   102/55 L  96 


 


 03/04/19 07:57  03/04/19 16:42  03/04/19 07:57  03/04/19 16:42  03/04/19 07:57











- Medications


Medications: 


                               Current Medications





Acetaminophen (Tylenol 325mg Tab)  650 mg PO Q6 PRN


   PRN Reason: Headache


Acetaminophen (Tylenol 325mg Tab)  650 mg PO Q4 PRN


   PRN Reason: Pain scale 1-10.


Atorvastatin Calcium (Lipitor)  20 mg PO DAILY [facility]


   Last Admin: 03/04/19 08:53 Dose:  20 mg


Cyanocobalamin (Vitamin B12 1000 Mcg Tab)  1,000 mcg PO DAILY [facility]


   Last Admin: 03/04/19 08:53 Dose:  1,000 mcg


Insulin Human Regular (Humulin R)  0 units SC [facility]; Protocol


   Last Admin: 03/04/19 16:42 Dose:  Not Given


Polyethylene Glycol (Miralax)  17 gm PO HS [facility]


   Last Admin: 03/03/19 21:29 Dose:  17 gm


Propranolol HCl (Inderal)  10 mg PO TID [facility]


   Last Admin: 03/04/19 16:42 Dose:  10 mg


Ramipril (Altace)  2.5 mg PO DAILY [facility]


   Last Admin: 03/04/19 08:52 Dose:  2.5 mg


Sertraline HCl (Zoloft)  100 mg PO DAILY [facility]


   Last Admin: 03/04/19 08:53 Dose:  100 mg











- Labs


Labs: 


                                        





                                 02/24/19 05:30 





                                 02/24/19 05:30 





                                        











PT  12.8 Seconds (9.8-13.1)   02/24/19  05:30    


 


INR  1.1   02/24/19  05:30    














Assessment and Plan


(1) Subdural hematoma


Status: Acute   





(2) Fall


Status: Acute

## 2019-03-04 NOTE — CP.PCM.PN
Subjective





- Date & Time of Evaluation


Date of Evaluation: 03/01/19


Time of Evaluation: 10:00





- Subjective


Subjective: 





Patient is doign well with PT


Has no chest pain or SOB


 Afebrile


 Has no headaches





Objective





- Vital Signs/Intake and Output


Vital Signs (last 24 hours): 


                                        











Temp Pulse Resp BP Pulse Ox


 


 98.6 F   65   21   135/73   96 


 


 03/04/19 07:57  03/04/19 08:52  03/04/19 07:57  03/04/19 08:52  03/04/19 07:57











- Medications


Medications: 


                               Current Medications





Acetaminophen (Tylenol 325mg Tab)  650 mg PO Q6 PRN


   PRN Reason: Headache


Acetaminophen (Tylenol 325mg Tab)  650 mg PO Q4 PRN


   PRN Reason: Pain scale 1-10.


Atorvastatin Calcium (Lipitor)  20 mg PO DAILY [facility]


   Last Admin: 03/04/19 08:53 Dose:  20 mg


Cyanocobalamin (Vitamin B12 1000 Mcg Tab)  1,000 mcg PO DAILY [facility]


   Last Admin: 03/04/19 08:53 Dose:  1,000 mcg


Insulin Human Regular (Humulin R)  0 units SC [facility]; Protocol


   Last Admin: 03/04/19 07:23 Dose:  Not Given


Polyethylene Glycol (Miralax)  17 gm PO HS [facility]


   Last Admin: 03/03/19 21:29 Dose:  17 gm


Propranolol HCl (Inderal)  10 mg PO TID [facility]


   Last Admin: 03/04/19 08:52 Dose:  10 mg


Ramipril (Altace)  2.5 mg PO DAILY [facility]


   Last Admin: 03/04/19 08:52 Dose:  2.5 mg


Sertraline HCl (Zoloft)  100 mg PO DAILY [facility]


   Last Admin: 03/04/19 08:53 Dose:  100 mg











- Labs


Labs: 


                                        





                                 02/24/19 05:30 





                                 02/24/19 05:30 





                                        











PT  12.8 Seconds (9.8-13.1)   02/24/19  05:30    


 


INR  1.1   02/24/19  05:30    














- Head Exam


Head Exam: NORMAL INSPECTION





- Eye Exam


Eye Exam: Normal appearance





- ENT Exam


ENT Exam: Mucous Membranes Moist





- Respiratory Exam


Respiratory Exam: Clear to Ausculation Bilateral





- Cardiovascular Exam


Cardiovascular Exam: REGULAR RHYTHM





- GI/Abdominal Exam


GI & Abdominal Exam: Soft





Assessment and Plan


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


Status: Acute   





(2) Essential tremor


Status: Acute   





(3) Hypertension


Status: Acute   





(4) Subdural hematoma


Status: Acute   





- Assessment and Plan (Free Text)


Plan: 





Cont meds


 Cont tx


 Cont PT

## 2019-03-04 NOTE — CP.PCM.PN
Subjective





- Date & Time of Evaluation


Date of Evaluation: 03/02/19


Time of Evaluation: 10:00





- Subjective


Subjective: 








Pt seen and assessed at bedside. Clinically improving; strength getting better. 

Able to follow commands. Left arm tremor is still present, however the pt is 

being treated with inderal. 





Review of Systems





- Review of Systems


All systems: reviewed and no additional remarkable complaints except





- Constitutional


Constitutional: Weakness





Objective





- Vital Signs/Intake and Output


Vital Signs (last 24 hours): 


                                        











Temp Pulse Resp BP Pulse Ox


 


 98.6 F   61   21   102/55 L  96 


 


 03/04/19 07:57  03/04/19 16:42  03/04/19 07:57  03/04/19 16:42  03/04/19 07:57











- Medications


Medications: 


                               Current Medications





Acetaminophen (Tylenol 325mg Tab)  650 mg PO Q6 PRN


   PRN Reason: Headache


Acetaminophen (Tylenol 325mg Tab)  650 mg PO Q4 PRN


   PRN Reason: Pain scale 1-10.


Atorvastatin Calcium (Lipitor)  20 mg PO DAILY [facility]


   Last Admin: 03/04/19 08:53 Dose:  20 mg


Cyanocobalamin (Vitamin B12 1000 Mcg Tab)  1,000 mcg PO DAILY [facility]


   Last Admin: 03/04/19 08:53 Dose:  1,000 mcg


Insulin Human Regular (Humulin R)  0 units SC [facility]; Protocol


   Last Admin: 03/04/19 16:42 Dose:  Not Given


Polyethylene Glycol (Miralax)  17 gm PO HS [facility]


   Last Admin: 03/03/19 21:29 Dose:  17 gm


Propranolol HCl (Inderal)  10 mg PO TID [facility]


   Last Admin: 03/04/19 16:42 Dose:  10 mg


Ramipril (Altace)  2.5 mg PO DAILY [facility]


   Last Admin: 03/04/19 08:52 Dose:  2.5 mg


Sertraline HCl (Zoloft)  100 mg PO DAILY [facility]


   Last Admin: 03/04/19 08:53 Dose:  100 mg











- Labs


Labs: 


                                        





                                 02/24/19 05:30 





                                 02/24/19 05:30 





                                        











PT  12.8 Seconds (9.8-13.1)   02/24/19  05:30    


 


INR  1.1   02/24/19  05:30    














- Head Exam


Head Exam: NORMAL INSPECTION, NORMOCEPHALIC


Additional comments: 








staple site to top, right part of head- clean, dry, and intact. 





- Eye Exam


Eye Exam: EOMI, Normal appearance, PERRL


Pupil Exam: NORMAL ACCOMODATION, PERRL





- ENT Exam


ENT Exam: Mucous Membranes Moist





- Neck Exam


Neck Exam: Full ROM, Normal Inspection





- Respiratory Exam


Respiratory Exam: Clear to Ausculation Bilateral





- Cardiovascular Exam


Cardiovascular Exam: REGULAR RHYTHM, +S1, +S2





- GI/Abdominal Exam


GI & Abdominal Exam: Soft, Normal Bowel Sounds





- Extremities Exam


Extremities Exam: Normal Capillary Refill


Additional comments: 





tremor to left arm. 





- Back Exam


Back Exam: NORMAL INSPECTION





- Neurological Exam


Neurological Exam: Alert, Awake


Neuro motor strength exam: Left Upper Extremity: 4, Right Upper Extremity: 5, 

Left Lower Extremity: 4, Right Lower Extremity: 5





- Psychiatric Exam


Psychiatric exam: Normal Affect, Normal Mood





- Skin


Skin Exam: Dry, Normal Color, Warm





Assessment and Plan


(1) Subdural hematoma


Assessment & Plan: 


1.) Subdural Hematoma





-strength improving; mild Left sided weakness noted compared to right side. 


-continue physical therapy/rehab.


-left arm tremor noted, pt on inderal. 


-consults input appreciated.


-continue current tx. 





Status: Acute

## 2019-03-04 NOTE — CP.PCM.PN
Subjective





- Date & Time of Evaluation


Date of Evaluation: 03/04/19


Time of Evaluation: 16:56





- Subjective


Subjective: 





Patient seen in the room


denies sob/cp


staples were prepped and removed with no issues


also one suture


well tolerated


continue current care





Objective





- Vital Signs/Intake and Output


Vital Signs (last 24 hours): 


                                        











Temp Pulse Resp BP Pulse Ox


 


 98.6 F   61   21   102/55 L  96 


 


 03/04/19 07:57  03/04/19 16:42  03/04/19 07:57  03/04/19 16:42  03/04/19 07:57











- Medications


Medications: 


                               Current Medications





Acetaminophen (Tylenol 325mg Tab)  650 mg PO Q6 PRN


   PRN Reason: Headache


Acetaminophen (Tylenol 325mg Tab)  650 mg PO Q4 PRN


   PRN Reason: Pain scale 1-10.


Atorvastatin Calcium (Lipitor)  20 mg PO DAILY [facility]


   Last Admin: 03/04/19 08:53 Dose:  20 mg


Cyanocobalamin (Vitamin B12 1000 Mcg Tab)  1,000 mcg PO DAILY [facility]


   Last Admin: 03/04/19 08:53 Dose:  1,000 mcg


Insulin Human Regular (Humulin R)  0 units SC [facility]; Protocol


   Last Admin: 03/04/19 16:42 Dose:  Not Given


Polyethylene Glycol (Miralax)  17 gm PO HS [facility]


   Last Admin: 03/03/19 21:29 Dose:  17 gm


Propranolol HCl (Inderal)  10 mg PO TID [facility]


   Last Admin: 03/04/19 16:42 Dose:  10 mg


Ramipril (Altace)  2.5 mg PO DAILY [facility]


   Last Admin: 03/04/19 08:52 Dose:  2.5 mg


Sertraline HCl (Zoloft)  100 mg PO DAILY [facility]


   Last Admin: 03/04/19 08:53 Dose:  100 mg











- Labs


Labs: 


                                        





                                 02/24/19 05:30 





                                 02/24/19 05:30 





                                        











PT  12.8 Seconds (9.8-13.1)   02/24/19  05:30    


 


INR  1.1   02/24/19  05:30

## 2019-03-04 NOTE — CP.PCM.PN
Subjective





- Date & Time of Evaluation


Date of Evaluation: 02/24/19


Time of Evaluation: 11:00





- Subjective


Subjective: 





patient is doing well with Phys therapy


Has no chest pain or SOB


 Afebrile


Has no headaches


Accuchecks are better.


 has less tremors with propranolol.





Objective





- Vital Signs/Intake and Output


Vital Signs (last 24 hours): 


                                        











Temp Pulse Resp BP Pulse Ox


 


 98.6 F   68   21   108/57 L  96 


 


 03/04/19 07:57  03/04/19 12:25  03/04/19 07:57  03/04/19 12:25  03/04/19 07:57











- Medications


Medications: 


                               Current Medications





Acetaminophen (Tylenol 325mg Tab)  650 mg PO Q6 PRN


   PRN Reason: Headache


Acetaminophen (Tylenol 325mg Tab)  650 mg PO Q4 PRN


   PRN Reason: Pain scale 1-10.


Atorvastatin Calcium (Lipitor)  20 mg PO DAILY [facility]


   Last Admin: 03/04/19 08:53 Dose:  20 mg


Cyanocobalamin (Vitamin B12 1000 Mcg Tab)  1,000 mcg PO DAILY [facility]


   Last Admin: 03/04/19 08:53 Dose:  1,000 mcg


Insulin Human Regular (Humulin R)  0 units SC [facility]; Protocol


   Last Admin: 03/04/19 12:22 Dose:  3 units


Polyethylene Glycol (Miralax)  17 gm PO HS [facility]


   Last Admin: 03/03/19 21:29 Dose:  17 gm


Propranolol HCl (Inderal)  10 mg PO TID [facility]


   Last Admin: 03/04/19 12:25 Dose:  10 mg


Ramipril (Altace)  2.5 mg PO DAILY [facility]


   Last Admin: 03/04/19 08:52 Dose:  2.5 mg


Sertraline HCl (Zoloft)  100 mg PO DAILY [facility]


   Last Admin: 03/04/19 08:53 Dose:  100 mg











- Labs


Labs: 


                                        





                                 02/24/19 05:30 





                                 02/24/19 05:30 





                                        











PT  12.8 Seconds (9.8-13.1)   02/24/19  05:30    


 


INR  1.1   02/24/19  05:30    














- Head Exam


Head Exam: NORMAL INSPECTION





- Eye Exam


Eye Exam: Normal appearance





- ENT Exam


ENT Exam: Mucous Membranes Moist





- Respiratory Exam


Respiratory Exam: Clear to Ausculation Bilateral





- Cardiovascular Exam


Cardiovascular Exam: REGULAR RHYTHM





- GI/Abdominal Exam


GI & Abdominal Exam: Normal Bowel Sounds





- Neurological Exam


Neurological Exam: Awake, Oriented x3





Assessment and Plan


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


Status: Acute   





(2) Essential tremor


Status: Acute   





(3) Hypertension


Status: Acute   





(4) Subdural hematoma


Status: Acute   





- Assessment and Plan (Free Text)


Plan: 





Con tmeds


Cont tx


Cont PT


 pain meds

## 2019-03-05 RX ADMIN — POLYETHYLENE GLYCOL 3350 SCH GM: 17 POWDER, FOR SOLUTION ORAL at 21:55

## 2019-03-05 NOTE — CP.PCM.PN
Subjective





- Date & Time of Evaluation


Date of Evaluation: 03/05/19


Time of Evaluation: 13:31





- Subjective


Subjective: 





patient seen in the room


denies sob/cp


right scalp looks good where I had taken the staples out


working hard in therapies but will need to d/c to [facility] 3/12/19


continue current care





Objective





- Vital Signs/Intake and Output


Vital Signs (last 24 hours): 


                                        











Temp Pulse Resp BP Pulse Ox


 


 97.2 F L  64   19   108/58 L  98 


 


 03/05/19 09:00  03/05/19 12:58  03/05/19 09:00  03/05/19 12:58  03/05/19 08:32











- Medications


Medications: 


                               Current Medications





Acetaminophen (Tylenol 325mg Tab)  650 mg PO Q6 PRN


   PRN Reason: Headache


Acetaminophen (Tylenol 325mg Tab)  650 mg PO Q4 PRN


   PRN Reason: Pain scale 1-10.


Atorvastatin Calcium (Lipitor)  20 mg PO DAILY [facility]


   Last Admin: 03/05/19 08:56 Dose:  20 mg


Cyanocobalamin (Vitamin B12 1000 Mcg Tab)  1,000 mcg PO DAILY [facility]


   Last Admin: 03/05/19 08:55 Dose:  1,000 mcg


Insulin Human Regular (Humulin R)  0 units SC [facility]; Protocol


   Last Admin: 03/05/19 12:00 Dose:  2 units


Polyethylene Glycol (Miralax)  17 gm PO HS [facility]


   Last Admin: 03/04/19 21:18 Dose:  17 gm


Propranolol HCl (Inderal)  10 mg PO Q6 [facility]


   Last Admin: 03/05/19 12:58 Dose:  10 mg


Ramipril (Altace)  2.5 mg PO DAILY [facility]


   Last Admin: 03/05/19 08:55 Dose:  2.5 mg


Sertraline HCl (Zoloft)  100 mg PO DAILY [facility]


   Last Admin: 03/05/19 08:56 Dose:  100 mg











- Labs


Labs: 


                                        





                                 02/24/19 05:30 





                                 02/24/19 05:30 





                                        











PT  12.8 Seconds (9.8-13.1)   02/24/19  05:30    


 


INR  1.1   02/24/19  05:30

## 2019-03-05 NOTE — PCM.PSYTMC
Acute Rehab Team Conference -





- Vital Signs:


Vital Signs (Last 8 Hours): 


                                   Vital Signs











  03/05/19 03/05/19 03/05/19





  08:32 08:54 08:55


 


Temperature 97.2 F L  


 


Pulse Rate 72 72 


 


Respiratory 19  





Rate   


 


Blood Pressure 122/68 122/68 122/68


 


O2 Sat by Pulse 98  





Oximetry   














  03/05/19 03/05/19





  09:00 12:58


 


Temperature 97.2 F L 


 


Pulse Rate 72 64


 


Respiratory 19 





Rate  


 


Blood Pressure 122/68 108/58 L


 


O2 Sat by Pulse  





Oximetry  











Pain: 0





- Precautions:


Precautions: Fall Prevention, Aspiration, Pressure Ulcer





- Medications/Other Issues:


Comment: forgetfull needs alot of cues with periods of incontinent of urine 

staples removed 3/4/19





- Consults:


Comment: Dr Del Angel





- Skin:


Incision Site: pronto parietal arae


Dressing Status: Clean, Dry, Intact


Incision: Healing Well


Incision Line Treatment: open to air





- Toileting:


Toileting: Minimal Assistance





- Bladder Management:


Bladder Pattern: Incontinent


Voiding Method: Toilet, Urinal, Diaper


Bladder Management: Minimal Assistance





- Transfers:


Transfers: Moderate Assistance





- ADL's:


ADL's: Minimal Assistance





- Pain Management:


Other Intervention:: denies any pain





- Patient/Family Teaching:


Other Intervention:: safety/fall medication teachings





- Goals/Time Frame:


Comment: as per multidiciplinary plan of care





- Provider:


Registered Nurse:: Cintia Santiago





Physical Therapy





- Bed Mobility


Bed Mobility: Verbal Cues, Minimal Assistance





- Transfers


Wheelchair to Mat: Verbal Cues, Minimal Assistance


Sit to Stand: Verbal Cues, Minimal Assistance





- Ambulation


Level of Assistance: Verbal Cues, Contact Guard, Minimal Assistance


Distance (ft.): 125


Assistive Devices: Rolling Walker





- Stair Negotiation


Stairs: Level of Assistance: Verbal Cues, Minimal Assistance, Moderate 

Assistance


Number of Stairs: 4


Handrails: Bilateral





- Standing Balance


Static Stand: Minimal Assistance


Comment: w/ RW





- Pain


Pain (assessed during therapy session): 0


Comment: N/A





- Insight/Carryover


Insight/Carryover: Fair





- Patient/Family Education


Comment: safety, strategies to increase attention to L side, POC





- Assessment/Plan


Assessment: Pt participated in 60 minute PT tx session focusing on BLE 

strengtehning exercises, balance and endurance activities, and functional 

mobility training. Pt ambulates with RW , fluctuates from CGA to mod A when 

performing turns. Pt will continue to benefit from skilled PT interventions to 

address deficits, reduce fall risk, and maximize functional independence.





- Goals


Timeframe: 2 weeks


Goals: Sit < > supine with supervision.  Sit < > stand transfers with 

supervision using RW.  pt will ambulate 200 ft with RW and supervision.  Pt will

neogtiate flight of stairs with handrail and CGA





- Provider


Physical Therapist:: Darcie Turner


License Number:: [id number]





Occupational Therapy





- Arousal/Attention/Orientation


Level of Consciousness: Awake, Alert, Forgetful


Patient Orientation: Person, Place, Time, Appropriate to Age, Appropriate to 

Situation


Assessment Comment: -forgetful @ times.  -imiting factors include ++intention 

tremors, L inattention, impaired sensation/proprioception in LUE , impaired 

postural control/awareness, impaired endurance/activity tolerance, impaired 

safety, impaired LUE coordination(gross/fine), impaired cognition--impacting on 

function in self care, transfers/mobility.  Pt will continue to benefit from 

skilled OT to further increase function/safety with daily livng tasks.





- ADL/IADL


Self Feeding: Supervision, Verbal Cues, Set-up Help


Grooming: Verbal Cues, Set-up Help, Contact Guard


Bathing-Upper Ext: Verbal Cues, Set-up Help, Minimal Assistance


Bathing-Lower Ext: Verbal Cues, Set-up Help, Minimal Assistance, Moderate 

Assistance


Dressing-Upper Ext: Verbal Cues, Set-up Help, Minimal Assistance, Moderate 

Assistance


Dressing-Lower Ext: Verbal Cues, Set-up Help, Moderate Assistance


Homemaking: Not Applicable


Comment: *uses adaptive devices for lower body dressing--long shoe horn.  

*completes lower body dressing--pants while long sitting in bed for safety





- Sitting Balance


Static Sitting: Supervision


Dynamic Sitting: Reaches across midline, Reaches out of base of support, Reaches

within base of support, Minimal Assistance


Comment: seated unsupported at edge of bed





- Transfers


Wheelchair to Bed Transfers: Verbal Cues, Set-up Help, Minimal Assistance, 

Moderate Assistance


Toilet Transfers: Verbal Cues, Set-up Help, Minimal Assistance, Moderate 

Assistance





- Wheelchair Management


Level of Assistance: Moderate Assistance


Distance (ft.): 20





- Upper Extremity Status


Right Upper Extremity Comment: AROm is WNLS, strength 4+/5


Left Upper Extremity Comment: AROM is WFLS, strength 3+/5 to 4-/5--limited 

primarily by impaired sensation/propriocetion





- Pain


Pain (assessed during therapy session): 0





- Insight/Carryover


Insight/Carryover: Fair





- Patient/Family Education


Comment: -adl, transfer/mobility training using compensatory strategies--further

training needed to increase carryover.  -LUE AROM activities, attending to LUE/L

environment, LLE--further training needed.  -postural control--sitting/standing 

for safety with self care.  -rehab/OT goals, plan of care.  -toileting program 

to minimize bowel/bladder incontinence, skin checks.  -safety measures, fall 

prevention: bed and w/c seatbelt alarm; pt knows how to open/c;lose setabelt





- Assessment/Plan


Assessment: Pt is a 73 year old R handed  Lithuanian speaking  with dx: subdural 

hematoma. *Precautions: falls, impaired cognition, L sided weakness, impaired 

proprioception/body awareness, impaired sensation/proprioception LUE, w/c and 

bed alarms.  Pt limited by the following impairments:  -impaired cognition-

memory, insight.  -impaired standing/sitting balance & tolerance.  -impaired 

activity tolerance.  -impaired safety awareness.  -impaired proprioception 

LUE/body.  -impaired AROM/strength in LUE/LLE and trunk.  -impaired postural 

control--which impact on self care, transfers/mobility and Iadls.  pt 

demonstrates increased LUE AROM/strength, L attention, increase function in 

upper/lower body dressing, use od adaptive device--long shoe horn. Pt completes 

lower body dressing with greater safety when long sitting in bed.  Pt will 

continue to benefit from skilled Occupational Therapy to address above areas to 

maximize function in self care, transfers & mobility using adaptive/comepnsatory

strategies.  Pt will continue to benefit from toileting program to minimize 

bouts of incontinence.  *Goal: 24/7 Supervision for adls/transfers/mobility or 

[facility] pending progress





- Goals


Timeframe: 8 days


Comment: -FEEDING: Distant Supervision/setup--position more to R side.  -GROOMI

NG: Distant Supervision/setup verbal cues for oral hygiene, hand/face 

washing/drying.  -UPPER BODY DRESSING: Min assist and verbal cues camilo-

techniques.  -LOWER BODY DRESSING: Min  assist and verbal cues camilo-technies.  -

TOILETING: Min-mod assist and verbal cues.  -TRANSFERS:<->bed, commode, w/c and 

other surfaces with Min assist and verbal cues.  -LUE STRENGTH/AWARENESS: 

increase to 4-/5  & awareness as needed to complete adls.  -W/C MANAGEMENT: min 

assist &  verbal cues to manage brakes, propel w/c 50-75 feet.  -BATHING: for 

upper/lower body --mod assist and verbal cues seated





- Provider


Occupational Therapist:: Teresa Barajas


License Number: [id number]





Speech Therapy





- Consult Information


Patient on Program: Yes


Medical Diagnosis: SDH


Treatment Diagnosis: mild-moderate cognitive deficits





- Assessment


Problem Solving Impairment: Mild


Memory Impairment: Moderate





- Plan


Assessment: Star Burnett presents with mild-moderate cognitive-linguistic 

deficits characterized by impaired orientation, short-term recall, problem 

solving, reasoning, sequencing, and thought organization all negatively 

impacting pt's safety and functional independence; however, pt has demonstrated 

improved insight into his deficits. Pt with good participation in tx tasks and 

would benefit from continued skilled speech tx for improved cognitive skills, 

safety, and functional independence.


Plan: Continue Speech/Language Therapy


Frequency: 3-5 times per week


Duration: 1 week


Goals/Timeframe: Please see progress note dated 3/4/19 for updated goals/POC


Recommendations: Continue speech tx 3-5x/week for improved cognition





- Provider


Therapist: Jasmin Jensen


License Number: [id number]





Recreational Therapy





- Participation


Participation: Participates in Individual and/or Group Sessions





- Attendance


Attendance: 3-5 times per week





- Activities


Leisure Activities: Cards and Games





- Socialization


Level of Socialization: Initiates/interacts freely with care givers and peer





- Assessment


Assessment/Plan: Pt is agreeable to participate in 1:1 and group recreation 

therapy sessions following encouragement. Pt has participated in spot-it 

matching task, modified isabel card task, and participated in bingo task with 

peers. Pt demonstrates improved attention to task and arousal level; however, 

during sessions pt will express concern with increase of tremors in L hand, 

which team is aware of. Pt will continue to benefit from participating in 

recreation therapy sessions throughout stay on unit.


Problems Currently Limiting Participation: L UE weakness, impaired problem 

solving, decrease leisure awareness level


Goals and Time Frame: Pt will tolerate 20 minutes of completing tabletop leisure

task utilizing both hands to improve leisure awareness level and strength in UE 

by date of discharge.





- Provider


Therapist: Morena Zamarripa





Nutrition





- Current Diet


Current Diet/Supplement/Feedings: Moderate consistent  CHO heart healthy diet





- Appetite


Percent Meal Consumed: %





- Assessment/Goals/Time Frame


Assessments/Goals/Time Frame: Pt at low nutritional risk.  no goals.  Follow-up 

due on 03/10/2019





- Provider


Provider: Maureen Reilly





Case Management





- Psychosocial Assessment


Support Systems: Star Burnett (son) - [phone]


Psychological Interventions/Needs: Patient is AAO with some forgetfulness.


Discharge Concerns: Patient lives alone in an apartment.


Patient/Family Meeting: CM met with patient and rehab team.


Intervention/Goal/Outcome: 1. Goal: 24 hr care 2. Plan: [facility] at [place] 

pending auth 3. obtain auth for [facility] from patient's [facility] A[employer] complete 4. arrange 

transportation to [place] 5. contact family to inform them of patient's 

transfer once patient is formally accepted 6. f/u with [facility] liaison





- Discharge Plan


Discharge Plan: Subacute care





- Provider


Provider: Nissa Corey


License Number: [id number]





Rehabilitation Plan





- Treatment Plan


Treatment Plan: Physical Therapy, Occupational Therapy, Speech, Dietary, 

Patient/Family Education





- Discharge Plan


Estimated Date of Discharge: 03/12/19


Discharge to: Subacute

## 2019-03-05 NOTE — CP.PCM.CON
History of Present Illness





- History of Present Illness


History of Present Illness: 





73 yr old male s/p subdural with profound tremor in left hand. The tremor is 

resting and has been present for many years. Neurology consult dictated. Please 

see note for more details. 





Plan: 


1. Start sinemet 100/250 tid, 7am, 12 noon and 4 pm. 


2. Continue pt


WIll sai Vaz 


[facility] neurology 





Past Patient History





- Past Medical History & Family History


Past Medical History?: Yes


Past Family History: Reviewed and not pertinent





- Past Social History


Smoking Status: Never Smoked





- CARDIAC


Hx Cardiac Disorders: Yes


Hx Hypercholesterolemia: Yes





- NEUROLOGICAL


HX Cerebrovascular Accident: Yes





- RENAL


Other/Comment: Azotemia





- ENDOCRINE/METABOLIC


Hx Diabetes Mellitus Type 2: Yes





- MUSCULOSKELETAL/RHEUMATOLOGICAL


Hx Arthritis: Yes





- PSYCHIATRIC


Hx Psychophysiologic Disorder: Yes


Hx Depression: Yes


Hx Substance Use: No





- ANESTHESIA


Hx Anesthesia: Yes


Hx Anesthesia Reactions: No


Hx Malignant Hyperthermia: No


Has any member of the family had a problem w/ anesthesia?: No





Meds


Allergies/Adverse Reactions: 


                                    Allergies











Allergy/AdvReac Type Severity Reaction Status Date / Time


 


No Known Allergies Allergy   Verified 02/22/19 18:46














- Medications


Medications: 


                               Current Medications





Acetaminophen (Tylenol 325mg Tab)  650 mg PO Q6 PRN


   PRN Reason: Headache


Acetaminophen (Tylenol 325mg Tab)  650 mg PO Q4 PRN


   PRN Reason: Pain scale 1-10.


Atorvastatin Calcium (Lipitor)  20 mg PO DAILY [facility]


   Last Admin: 03/05/19 08:56 Dose:  20 mg


Cyanocobalamin (Vitamin B12 1000 Mcg Tab)  1,000 mcg PO DAILY [facility]


   Last Admin: 03/05/19 08:55 Dose:  1,000 mcg


Insulin Human Regular (Humulin R)  0 units SC [facility]; Protocol


   Last Admin: 03/05/19 12:00 Dose:  2 units


Polyethylene Glycol (Miralax)  17 gm PO HS [facility]


   Last Admin: 03/04/19 21:18 Dose:  17 gm


Propranolol HCl (Inderal)  10 mg PO Q6 [facility]


   Last Admin: 03/05/19 12:58 Dose:  10 mg


Ramipril (Altace)  2.5 mg PO DAILY [facility]


   Last Admin: 03/05/19 08:55 Dose:  2.5 mg


Sertraline HCl (Zoloft)  100 mg PO DAILY [facility]


   Last Admin: 03/05/19 08:56 Dose:  100 mg











Results





- Vital Signs


Recent Vital Signs: 


                                Last Vital Signs











Temp  97.2 F L  03/05/19 09:00


 


Pulse  64   03/05/19 12:58


 


Resp  19   03/05/19 09:00


 


BP  108/58 L  03/05/19 12:58


 


Pulse Ox  98   03/05/19 08:45














- Labs


Result Diagrams: 


                                 02/24/19 05:30





                                 02/24/19 05:30


Labs: 


                         Laboratory Results - last 24 hr











  03/04/19 03/04/19 03/04/19





  16:09 16:40 20:16


 


POC Glucose (mg/dL)  62 L  112 H  111 H














  03/05/19 03/05/19





  05:29 11:11


 


POC Glucose (mg/dL)  129 H  180 H

## 2019-03-05 NOTE — CP.PCM.PN
Subjective





- Date & Time of Evaluation


Date of Evaluation: 03/05/19


Time of Evaluation: 11:05





- Subjective


Subjective: 





patient has been stable


 Doing well with PT


 Has no headaches


Noted to have increasing left upper extremity tremors


 On Inderal 10 mg tid








Objective





- Vital Signs/Intake and Output


Vital Signs (last 24 hours): 


                                        











Temp Pulse Resp BP Pulse Ox


 


 97.9 F   66   20   106/63   97 


 


 03/05/19 20:22  03/05/19 20:22  03/05/19 20:22  03/05/19 20:22  03/05/19 20:22











- Medications


Medications: 


                               Current Medications





Acetaminophen (Tylenol 325mg Tab)  650 mg PO Q6 PRN


   PRN Reason: Headache


Acetaminophen (Tylenol 325mg Tab)  650 mg PO Q4 PRN


   PRN Reason: Pain scale 1-10.


Atorvastatin Calcium (Lipitor)  20 mg PO DAILY [facility]


   Last Admin: 03/05/19 08:56 Dose:  20 mg


Carbidopa/Levodopa (Sinemet)  1 tab PO TID [facility]


   Last Admin: 03/05/19 19:59 Dose:  1 tab


Cyanocobalamin (Vitamin B12 1000 Mcg Tab)  1,000 mcg PO DAILY [facility]


   Last Admin: 03/05/19 08:55 Dose:  1,000 mcg


Insulin Human Regular (Humulin R)  0 units SC [facility]; Protocol


   Last Admin: 03/05/19 21:54 Dose:  Not Given


Polyethylene Glycol (Miralax)  17 gm PO HS [facility]


   Last Admin: 03/05/19 21:55 Dose:  17 gm


Propranolol HCl (Inderal)  10 mg PO Q6 [facility]


   Last Admin: 03/05/19 17:26 Dose:  10 mg


Ramipril (Altace)  2.5 mg PO DAILY [facility]


   Last Admin: 03/05/19 08:55 Dose:  2.5 mg


Sertraline HCl (Zoloft)  100 mg PO DAILY [facility]


   Last Admin: 03/05/19 08:56 Dose:  100 mg











- Labs


Labs: 


                                        





                                 02/24/19 05:30 





                                 02/24/19 05:30 





                                        











PT  12.8 Seconds (9.8-13.1)   02/24/19  05:30    


 


INR  1.1   02/24/19  05:30    














- Head Exam


Head Exam: NORMAL INSPECTION





- Eye Exam


Eye Exam: Normal appearance





- ENT Exam


ENT Exam: Mucous Membranes Moist





- Respiratory Exam


Respiratory Exam: Clear to Ausculation Bilateral





- Cardiovascular Exam


Cardiovascular Exam: REGULAR RHYTHM





- GI/Abdominal Exam


GI & Abdominal Exam: Normal Bowel Sounds





- Neurological Exam


Neurological Exam: Awake





Assessment and Plan


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


Status: Acute   





(2) Essential tremor


Status: Acute   





(3) Hypertension


Status: Acute   





(4) Subdural hematoma


Status: Acute   





- Assessment and Plan (Free Text)


Plan: 





Con tmeds


 Cont tx


 Neuro eval for possible Parkinson's


 increase inderal to 10 mg qid

## 2019-03-06 RX ADMIN — POLYETHYLENE GLYCOL 3350 SCH GM: 17 POWDER, FOR SOLUTION ORAL at 21:19

## 2019-03-06 NOTE — CON
DATE:  03/05/2019



NEUROLOGY CONSULTATION



CALLED BY:  Harsha Katz MD



HISTORY OF PRESENT ILLNESS:  This is a 73-year-old male who was admitted to

the rehab facility with a history of residual hematoma, status post

evacuation.  The patient has been here since 02/22/2019.  In brief, the

history is as follows:  He had a fall at home.  He had a subdural drain

completed by Neurosurgery, and he was in rehab.



PAST MEDICAL HISTORY:  Nonsignificant.



FAMILY HISTORY:  The patient is , lives with his wife.



SOCIAL HISTORY:  There is no tobacco.  There is no alcohol.  There is no

EtOH.  There is no IVDA.



MEDICATIONS:  He is not on any significant medications.



During rehab, he was doing well, but yesterday afternoon, the team noticed

that he had significant tremor in his left arm, and neurology consult was

called.  The patient, during the interview, states that he has had the

tremor for over ten years and has always had difficulty walking.  Has never

seen a neurologist.  CAT scan is not noted.



REVIEW OF SYSTEMS:  Negative for headache, nausea, vomiting, weakness,

aphasia, or dysarthria.



PHYSICAL EXAMINATION:

GENERAL:  The patient is alert and oriented x3.  There is a large

curvilinear scar in the right temporoparietal region which is in the

healing process.

NEUROLOGIC:  The patient's mini mental status is 30/30.  Exam was conducted

in [place].  There is no facial asymmetry.  Motor tone is normal.  Strength

is normal.  There is a high amplitude resting tremor noted in the left arm.

There is also tremor noted in the right hand.  Sensory is intact to fine

touch, pin.  Finger-to-nose shows that there is a sense of increase in

amplitude of activity.  There is cogwheel rigidity bilaterally.  There is

bradykinesia.  The patient had trouble getting up to walk.  When he did

walk, he walked with a slow gait and had postural instability with a 6 point 
turn. 



LABORATORY DATA:  Labs are within normal limits.



IMPRESSION:  This is a 73-year-old male with Parkinson's disease,

etiology unknown.



PLAN:  Start Sinemet 25/100 t.i.d. at 7 a.m, 12 noon, and 5 p.m.



Thank you for this interesting consult.  Our team will follow.





__________________________________________

Bette Vaz MD





DD:  03/05/2019 21:20:28

DT:  03/06/2019 0:45:52

Job # [id number]

MIKHAIL

## 2019-03-06 NOTE — CP.PCM.PN
Subjective





- Date & Time of Evaluation


Date of Evaluation: 03/06/19


Time of Evaluation: 17:03





- Subjective


Subjective: 





Patient seen in the room


doing well


incision CDI


well approximated


improving strength


continue current care





Objective





- Vital Signs/Intake and Output


Vital Signs (last 24 hours): 


                                        











Temp Pulse Resp BP Pulse Ox


 


 97.3 F L  67   19   118/55 L  98 


 


 03/06/19 08:13  03/06/19 12:27  03/06/19 08:13  03/06/19 12:27  03/06/19 08:13











- Medications


Medications: 


                               Current Medications





Acetaminophen (Tylenol 325mg Tab)  650 mg PO Q6 PRN


   PRN Reason: Headache


Acetaminophen (Tylenol 325mg Tab)  650 mg PO Q4 PRN


   PRN Reason: Pain scale 1-10.


Atorvastatin Calcium (Lipitor)  20 mg PO DAILY Counts include 234 beds at the [facility]


   Last Admin: 03/06/19 09:29 Dose:  20 mg


Carbidopa/Levodopa (Sinemet)  1 tab PO TID Counts include 234 beds at the [facility]


   Last Admin: 03/06/19 12:26 Dose:  1 tab


Cyanocobalamin (Vitamin B12 1000 Mcg Tab)  1,000 mcg PO DAILY Counts include 234 beds at the [facility]


   Last Admin: 03/06/19 09:28 Dose:  1,000 mcg


Insulin Human Regular (Humulin R)  0 units SC [facility]; Protocol


   Last Admin: 03/06/19 12:25 Dose:  Not Given


Polyethylene Glycol (Miralax)  17 gm PO HS Counts include 234 beds at the [facility]


   Last Admin: 03/05/19 21:55 Dose:  17 gm


Propranolol HCl (Inderal)  10 mg PO Q6 Counts include 234 beds at the [facility]


   Last Admin: 03/06/19 12:27 Dose:  10 mg


Ramipril (Altace)  2.5 mg PO DAILY Counts include 234 beds at the [facility]


   Last Admin: 03/06/19 09:28 Dose:  2.5 mg


Sertraline HCl (Zoloft)  100 mg PO DAILY Counts include 234 beds at the [facility]


   Last Admin: 03/06/19 09:28 Dose:  100 mg











- Labs


Labs: 


                                        





                                 02/24/19 05:30 





                                 02/24/19 05:30 





                                        











PT  12.8 Seconds (9.8-13.1)   02/24/19  05:30    


 


INR  1.1   02/24/19  05:30

## 2019-03-07 RX ADMIN — POLYETHYLENE GLYCOL 3350 SCH GM: 17 POWDER, FOR SOLUTION ORAL at 21:23

## 2019-03-07 NOTE — CP.PCM.PN
Subjective





- Date & Time of Evaluation


Date of Evaluation: 03/07/19


Time of Evaluation: 10:30





- Subjective


Subjective: 





patient seen and examined during therapy


Interim events noted


No complaints offered at this time


denies cp/sob/fever/chills.


available diagnostic data reviewed





Review of Systems All systems: reviewed and no additional remarkable complaints 

except mentioned above





Objective





Vital Signs Stable


- Constitutional


Appears: Non-toxic, No Acute Distress





Head Exam: NORMAL INSPECTION, staples of right scalp have been removed, incision

c/d/i





Eye Exam: Normal appearance





Respiratory Exam: NORMAL BREATHING PATTERN





Cardiovascular Exam: +S1, +S2





GI & Abdominal Exam: Soft





Neurological Exam: Alert, Awake





Psychiatric exam: Normal Affect, Normal Mood





Skin Exam: Normal Color, Warm





Assessment and Plan





monitor vitals


monitor labs


Cont meds


Cont therapy


consultants appreciated input


rest of plan as ordered





Objective





- Vital Signs/Intake and Output


Vital Signs (last 24 hours): 


                                        











Temp Pulse Resp BP Pulse Ox


 


 97.5 F L  64   20   113/60   97 


 


 03/07/19 08:29  03/07/19 17:22  03/07/19 08:29  03/07/19 17:22  03/07/19 08:29











- Medications


Medications: 


                               Current Medications





Acetaminophen (Tylenol 325mg Tab)  650 mg PO Q6 PRN


   PRN Reason: Headache


Acetaminophen (Tylenol 325mg Tab)  650 mg PO Q4 PRN


   PRN Reason: Pain scale 1-10.


Atorvastatin Calcium (Lipitor)  20 mg PO DAILY [facility]


   Last Admin: 03/07/19 08:18 Dose:  20 mg


Carbidopa/Levodopa (Sinemet)  1 tab PO TID@0700,1200,1700 [facility]


   Last Admin: 03/07/19 17:22 Dose:  1 tab


Cyanocobalamin (Vitamin B12 1000 Mcg Tab)  1,000 mcg PO DAILY [facility]


   Last Admin: 03/07/19 08:19 Dose:  1,000 mcg


Insulin Human Regular (Humulin R)  0 units SC [facility]; Protocol


   Last Admin: 03/07/19 17:23 Dose:  Not Given


Polyethylene Glycol (Miralax)  17 gm PO HS [facility]


   Last Admin: 03/06/19 21:19 Dose:  17 gm


Propranolol HCl (Inderal)  10 mg PO Q6 [facility]


   Last Admin: 03/07/19 17:22 Dose:  10 mg


Ramipril (Altace)  2.5 mg PO DAILY [facility]


   Last Admin: 03/07/19 08:18 Dose:  2.5 mg


Sertraline HCl (Zoloft)  100 mg PO DAILY [facility]


   Last Admin: 03/07/19 08:19 Dose:  100 mg











- Labs


Labs: 


                                        





                                 02/24/19 05:30 





                                 02/24/19 05:30 





                                        











PT  12.8 Seconds (9.8-13.1)   02/24/19  05:30    


 


INR  1.1   02/24/19  05:30    














Assessment and Plan


(1) Subdural hematoma


Status: Acute   





(2) Fall


Status: Acute

## 2019-03-07 NOTE — CP.PCM.PN
Subjective





- Date & Time of Evaluation


Date of Evaluation: 03/07/19


Time of Evaluation: 14:22





- Subjective


Subjective: 





Neuro Follow up Note:





Mr. Burnett was evaluated this afternoon at bedside.  He states that his hand 

tremors are improving since being started on Sinemet.  He offers no complaints 

today.  Denies h/a, dizziness, visual changes, chest pain, sob, abd pain, n/v/d,

paresthesias.





Objective





- Vital Signs/Intake and Output


Vital Signs (last 24 hours): 


                                        











Temp Pulse Resp BP Pulse Ox


 


 97.5 F L  62   20   117/67   97 


 


 03/07/19 08:29  03/07/19 12:36  03/07/19 08:29  03/07/19 12:36  03/07/19 08:29











- Medications


Medications: 


                               Current Medications





Acetaminophen (Tylenol 325mg Tab)  650 mg PO Q6 PRN


   PRN Reason: Headache


Acetaminophen (Tylenol 325mg Tab)  650 mg PO Q4 PRN


   PRN Reason: Pain scale 1-10.


Atorvastatin Calcium (Lipitor)  20 mg PO DAILY [facility]


   Last Admin: 03/07/19 08:18 Dose:  20 mg


Carbidopa/Levodopa (Sinemet)  1 tab PO TID@0700,1200,1700 [facility]


   Last Admin: 03/07/19 12:36 Dose:  1 tab


Cyanocobalamin (Vitamin B12 1000 Mcg Tab)  1,000 mcg PO DAILY [facility]


   Last Admin: 03/07/19 08:19 Dose:  1,000 mcg


Insulin Human Regular (Humulin R)  0 units SC [facility]; Protocol


   Last Admin: 03/07/19 12:00 Dose:  Not Given


Polyethylene Glycol (Miralax)  17 gm PO HS [facility]


   Last Admin: 03/06/19 21:19 Dose:  17 gm


Propranolol HCl (Inderal)  10 mg PO Q6 [facility]


   Last Admin: 03/07/19 12:36 Dose:  10 mg


Ramipril (Altace)  2.5 mg PO DAILY [facility]


   Last Admin: 03/07/19 08:18 Dose:  2.5 mg


Sertraline HCl (Zoloft)  100 mg PO DAILY [facility]


   Last Admin: 03/07/19 08:19 Dose:  100 mg











- Labs


Labs: 


                                        





                                 02/24/19 05:30 





                                 02/24/19 05:30 





                                        











PT  12.8 Seconds (9.8-13.1)   02/24/19  05:30    


 


INR  1.1   02/24/19  05:30    














- Constitutional


Appears: Well, Non-toxic, No Acute Distress





- Head Exam


Additional comments: 





Post-surgical incision site noted to left parietal area.





- Eye Exam


Eye Exam: EOMI, Normal appearance, PERRL


Pupil Exam: NORMAL ACCOMODATION, PERRL





- ENT Exam


ENT Exam: Mucous Membranes Moist





- Neck Exam


Neck Exam: Full ROM, Normal Inspection





- Respiratory Exam


Respiratory Exam: NORMAL BREATHING PATTERN





- Extremities Exam


Extremities Exam: Full ROM.  absent: Calf Tenderness, Pedal Edema


Additional comments: 





generalized weakness to BLE 2/2 deconditioning





- Neurological Exam


Neurological Exam: Alert, Awake, CN II-XII Intact, Oriented x3


Neuro motor strength exam: Left Upper Extremity: 5 ( 5/5), Right Upper 

Extremity: 5 ( 5/5), Left Lower Extremity: 4, Right Lower Extremity: 4


Additional comments: 





Speech clear, fluid


Generalized weakness to BLE 2/2 deconditioning


+ b/l hand tremors noted, more to the left hand


No bradykinesia noted; cogwheeling has improved





- Psychiatric Exam


Psychiatric exam: Normal Affect, Normal Mood





- Skin


Skin Exam: Normal Color


Additional comments: 





post-surgical incision site noted to left parietal area.





Assessment and Plan


(1) Essential tremor


Assessment & Plan: 


-Continue Sinemet as ordered at 7am, 12pm, and 5pm


-Continue therapy as tolerated.


-Recommend Sid scan as outpatient to r/o Parkinsons.


-Notify neuro team of any acute changes to pt's condition.  Will f/u with pt 

next week prior to his d/c.





Meron Moya, MARGARITA, APN


Case discussed with Dr. Vaz


Status: Acute

## 2019-03-08 RX ADMIN — POLYETHYLENE GLYCOL 3350 SCH GM: 17 POWDER, FOR SOLUTION ORAL at 21:02

## 2019-03-08 NOTE — CP.PCM.PN
Subjective





- Date & Time of Evaluation


Date of Evaluation: 03/08/19


Time of Evaluation: 16:35





- Subjective


Subjective: 





Patient seen in the gym


happy with progress


denies pain


scalp incision continues to mature and is approximated.





Objective





- Vital Signs/Intake and Output


Vital Signs (last 24 hours): 


                                        











Temp Pulse Resp BP Pulse Ox


 


 98.2 F   60   18   87/57 L  95 


 


 03/08/19 09:11  03/08/19 11:53  03/08/19 09:11  03/08/19 11:53  03/08/19 09:11











- Medications


Medications: 


                               Current Medications





Acetaminophen (Tylenol 325mg Tab)  650 mg PO Q6 PRN


   PRN Reason: Headache


Acetaminophen (Tylenol 325mg Tab)  650 mg PO Q4 PRN


   PRN Reason: Pain scale 1-10.


Atorvastatin Calcium (Lipitor)  20 mg PO DAILY [facility]


   Last Admin: 03/08/19 08:43 Dose:  20 mg


Carbidopa/Levodopa (Sinemet)  1 tab PO TID@0700,1200,1700 [facility]


   Last Admin: 03/08/19 16:20 Dose:  1 tab


Cyanocobalamin (Vitamin B12 1000 Mcg Tab)  1,000 mcg PO DAILY [facility]


   Last Admin: 03/08/19 08:44 Dose:  1,000 mcg


Insulin Human Regular (Humulin R)  0 units SC [facility]; Protocol


   Last Admin: 03/08/19 16:20 Dose:  Not Given


Polyethylene Glycol (Miralax)  17 gm PO HS [facility]


   Last Admin: 03/07/19 21:23 Dose:  17 gm


Propranolol HCl (Inderal)  10 mg PO Q6 [facility]


   Last Admin: 03/08/19 11:53 Dose:  Not Given


Ramipril (Altace)  2.5 mg PO DAILY [facility]


   Last Admin: 03/08/19 08:43 Dose:  2.5 mg


Sertraline HCl (Zoloft)  100 mg PO DAILY [facility]


   Last Admin: 03/08/19 08:43 Dose:  100 mg











- Labs


Labs: 


                                        





                                 02/24/19 05:30 





                                 02/24/19 05:30 





                                        











PT  12.8 Seconds (9.8-13.1)   02/24/19  05:30    


 


INR  1.1   02/24/19  05:30

## 2019-03-08 NOTE — CP.PCM.PN
Subjective





- Date & Time of Evaluation


Date of Evaluation: 03/06/19


Time of Evaluation: 08:50





- Subjective


Subjective: 





Pt seen and assessed at bedside. Improving well, no new complaints. Left upper 

extremity tremor has not subsided, neurology was consulted and the pt started 

sinimet TID. 





Review of Systems





- Review of Systems


All systems: reviewed and no additional remarkable complaints except (left arm 

tremor.)





Objective





- Vital Signs/Intake and Output


Vital Signs (last 24 hours): 


                                        











Temp Pulse Resp BP Pulse Ox


 


 97.9 F   62   20   112/63   98 


 


 03/07/19 20:00  03/07/19 23:31  03/07/19 20:00  03/07/19 23:31  03/07/19 20:00











- Medications


Medications: 


                               Current Medications





Acetaminophen (Tylenol 325mg Tab)  650 mg PO Q6 PRN


   PRN Reason: Headache


Acetaminophen (Tylenol 325mg Tab)  650 mg PO Q4 PRN


   PRN Reason: Pain scale 1-10.


Atorvastatin Calcium (Lipitor)  20 mg PO DAILY [facility]


   Last Admin: 03/07/19 08:18 Dose:  20 mg


Carbidopa/Levodopa (Sinemet)  1 tab PO TID@0700,1200,1700 [facility]


   Last Admin: 03/07/19 17:22 Dose:  1 tab


Cyanocobalamin (Vitamin B12 1000 Mcg Tab)  1,000 mcg PO DAILY [facility]


   Last Admin: 03/07/19 08:19 Dose:  1,000 mcg


Insulin Human Regular (Humulin R)  0 units SC [facility]; Protocol


   Last Admin: 03/07/19 21:26 Dose:  Not Given


Polyethylene Glycol (Miralax)  17 gm PO HS [facility]


   Last Admin: 03/07/19 21:23 Dose:  17 gm


Propranolol HCl (Inderal)  10 mg PO Q6 [facility]


   Last Admin: 03/07/19 23:31 Dose:  10 mg


Ramipril (Altace)  2.5 mg PO DAILY [facility]


   Last Admin: 03/07/19 08:18 Dose:  2.5 mg


Sertraline HCl (Zoloft)  100 mg PO DAILY [facility]


   Last Admin: 03/07/19 08:19 Dose:  100 mg











- Labs


Labs: 


                                        





                                 02/24/19 05:30 





                                 02/24/19 05:30 





                                        











PT  12.8 Seconds (9.8-13.1)   02/24/19  05:30    


 


INR  1.1   02/24/19  05:30    














- Head Exam


Head Exam: ATRAUMATIC, NORMAL INSPECTION, NORMOCEPHALIC


Additional comments: 





right fronto-parietal area staples noted. 





- Eye Exam


Eye Exam: EOMI, Normal appearance, PERRL


Pupil Exam: NORMAL ACCOMODATION, PERRL





- ENT Exam


ENT Exam: Mucous Membranes Moist





- Neck Exam


Neck Exam: Normal Inspection





- Respiratory Exam


Respiratory Exam: Clear to Ausculation Bilateral





- Cardiovascular Exam


Cardiovascular Exam: REGULAR RHYTHM, +S1, +S2





- GI/Abdominal Exam


GI & Abdominal Exam: Soft, Normal Bowel Sounds





- Extremities Exam


Extremities Exam: Normal Capillary Refill, Normal Inspection





- Back Exam


Back Exam: NORMAL INSPECTION





- Neurological Exam


Neurological Exam: Alert, Awake


Neuro motor strength exam: Left Upper Extremity: 4, Right Upper Extremity: 5, 

Left Lower Extremity: 4, Right Lower Extremity: 5





- Psychiatric Exam


Psychiatric exam: Normal Affect, Normal Mood





- Skin


Skin Exam: Dry, Normal Color, Warm





Assessment and Plan


(1) Subdural hematoma


Assessment & Plan: 


1.) Subdural Hematoma





-Continue improving strength (PT/OT).


-Pt was previously being treated for tremors of the left upper extremity with 

Inderal. 


-Neuro was consulted and saw pt; Sinemet was started TID. Clinical impression is

now likely parkinsonism as per neuro. 


-Maintain neuro checks.


-safety precautions. 


-continue current Tx.


 


Status: Acute

## 2019-03-09 RX ADMIN — POLYETHYLENE GLYCOL 3350 SCH GM: 17 POWDER, FOR SOLUTION ORAL at 21:25

## 2019-03-10 RX ADMIN — POLYETHYLENE GLYCOL 3350 SCH GM: 17 POWDER, FOR SOLUTION ORAL at 21:22

## 2019-03-10 NOTE — CP.PCM.PN
Subjective





- Date & Time of Evaluation


Date of Evaluation: 03/09/19


Time of Evaluation: 11:00





- Subjective


Subjective: 





Patient is doing well with PT


Has no headaches Has no chest pain or SOB


Has poor gait and balance


 Still with a lot of tremors on the left hand.





Objective





- Vital Signs/Intake and Output


Vital Signs (last 24 hours): 


                                        











Temp Pulse Resp BP Pulse Ox


 


 98.2 F   68   20   105/66   96 


 


 03/10/19 08:08  03/10/19 17:17  03/10/19 08:08  03/10/19 17:17  03/10/19 08:08











- Medications


Medications: 


                               Current Medications





Acetaminophen (Tylenol 325mg Tab)  650 mg PO Q6 PRN


   PRN Reason: Headache


Acetaminophen (Tylenol 325mg Tab)  650 mg PO Q4 PRN


   PRN Reason: Pain scale 1-10.


Atorvastatin Calcium (Lipitor)  20 mg PO DAILY [facility]


   Last Admin: 03/10/19 08:46 Dose:  20 mg


Carbidopa/Levodopa (Sinemet)  1 tab PO TID@0700,1200,1700 [facility]


   Last Admin: 03/10/19 17:16 Dose:  1 tab


Cyanocobalamin (Vitamin B12 1000 Mcg Tab)  1,000 mcg PO DAILY [facility]


   Last Admin: 03/10/19 08:46 Dose:  1,000 mcg


Insulin Human Regular (Humulin R)  0 units SC [facility]; Protocol


   Last Admin: 03/10/19 21:25 Dose:  Not Given


Polyethylene Glycol (Miralax)  17 gm PO HS [facility]


   Last Admin: 03/10/19 21:22 Dose:  17 gm


Propranolol HCl (Inderal)  10 mg PO Q6 [facility]


   Last Admin: 03/10/19 17:17 Dose:  Not Given


Ramipril (Altace)  2.5 mg PO DAILY [facility]


   Last Admin: 03/10/19 08:47 Dose:  2.5 mg


Sertraline HCl (Zoloft)  100 mg PO DAILY [facility]


   Last Admin: 03/10/19 08:46 Dose:  100 mg











- Labs


Labs: 


                                        





                                 02/24/19 05:30 





                                 02/24/19 05:30 





                                        











PT  12.8 Seconds (9.8-13.1)   02/24/19  05:30    


 


INR  1.1   02/24/19  05:30    














- Head Exam


Head Exam: NORMAL INSPECTION





- Eye Exam


Eye Exam: Normal appearance





- ENT Exam


ENT Exam: Mucous Membranes Moist





- Respiratory Exam


Respiratory Exam: Clear to Ausculation Bilateral





- Cardiovascular Exam


Cardiovascular Exam: REGULAR RHYTHM





- GI/Abdominal Exam


GI & Abdominal Exam: Normal Bowel Sounds





- Neurological Exam


Neurological Exam: Awake, Oriented x3


Additional comments: 





tremors on the left hand





Assessment and Plan


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


Status: Acute   





(2) Essential tremor


Status: Acute   





(3) Hypertension


Status: Acute   





(4) Subdural hematoma


Status: Acute   





- Assessment and Plan (Free Text)


Plan: 





Cont meds


Cont PT


Cont tx


adjust propranolol

## 2019-03-10 NOTE — CP.PCM.PN
Subjective





- Date & Time of Evaluation


Date of Evaluation: 03/10/19


Time of Evaluation: 11:20





- Subjective


Subjective: 





Patient remains well


Has no chest  pain or SOB


Has less tremors.


Started on sinemet.





Objective





- Vital Signs/Intake and Output


Vital Signs (last 24 hours): 


                                        











Temp Pulse Resp BP Pulse Ox


 


 98.2 F   68   20   105/66   96 


 


 03/10/19 08:08  03/10/19 17:17  03/10/19 08:08  03/10/19 17:17  03/10/19 08:08











- Medications


Medications: 


                               Current Medications





Acetaminophen (Tylenol 325mg Tab)  650 mg PO Q6 PRN


   PRN Reason: Headache


Acetaminophen (Tylenol 325mg Tab)  650 mg PO Q4 PRN


   PRN Reason: Pain scale 1-10.


Atorvastatin Calcium (Lipitor)  20 mg PO DAILY [facility]


   Last Admin: 03/10/19 08:46 Dose:  20 mg


Carbidopa/Levodopa (Sinemet)  1 tab PO TID@0700,1200,1700 [facility]


   Last Admin: 03/10/19 17:16 Dose:  1 tab


Cyanocobalamin (Vitamin B12 1000 Mcg Tab)  1,000 mcg PO DAILY [facility]


   Last Admin: 03/10/19 08:46 Dose:  1,000 mcg


Insulin Human Regular (Humulin R)  0 units SC [facility]; Protocol


   Last Admin: 03/10/19 21:25 Dose:  Not Given


Polyethylene Glycol (Miralax)  17 gm PO HS [facility]


   Last Admin: 03/10/19 21:22 Dose:  17 gm


Propranolol HCl (Inderal)  10 mg PO Q6 [facility]


   Last Admin: 03/10/19 17:17 Dose:  Not Given


Ramipril (Altace)  2.5 mg PO DAILY [facility]


   Last Admin: 03/10/19 08:47 Dose:  2.5 mg


Sertraline HCl (Zoloft)  100 mg PO DAILY [facility]


   Last Admin: 03/10/19 08:46 Dose:  100 mg











- Labs


Labs: 


                                        





                                 02/24/19 05:30 





                                 02/24/19 05:30 





                                        











PT  12.8 Seconds (9.8-13.1)   02/24/19  05:30    


 


INR  1.1   02/24/19  05:30    














- Head Exam


Head Exam: NORMAL INSPECTION





- Eye Exam


Eye Exam: Normal appearance





- ENT Exam


ENT Exam: Mucous Membranes Moist





- Respiratory Exam


Respiratory Exam: Clear to Ausculation Bilateral





- Cardiovascular Exam


Cardiovascular Exam: REGULAR RHYTHM





- GI/Abdominal Exam


GI & Abdominal Exam: Normal Bowel Sounds





- Neurological Exam


Neurological Exam: Awake, Oriented x3





Assessment and Plan


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


Status: Acute   





(2) Essential tremor


Status: Acute   





(3) Hypertension


Status: Acute   





(4) Subdural hematoma


Status: Acute   





(5) Parkinson disease


Status: Acute   





- Assessment and Plan (Free Text)


Plan: 





Cont meds


Cont tx


Cont PT


 cont sinemet.


decrease propranolol.

## 2019-03-10 NOTE — CP.PCM.PN
Subjective





- Date & Time of Evaluation


Date of Evaluation: 03/08/19


Time of Evaluation: 10:25





- Subjective


Subjective: 





Patient continues to have tremors on the left hand


Otherwise doing well with PT





Objective





- Vital Signs/Intake and Output


Vital Signs (last 24 hours): 


                                        











Temp Pulse Resp BP Pulse Ox


 


 98.2 F   68   20   105/66   96 


 


 03/10/19 08:08  03/10/19 17:17  03/10/19 08:08  03/10/19 17:17  03/10/19 08:08











- Medications


Medications: 


                               Current Medications





Acetaminophen (Tylenol 325mg Tab)  650 mg PO Q6 PRN


   PRN Reason: Headache


Acetaminophen (Tylenol 325mg Tab)  650 mg PO Q4 PRN


   PRN Reason: Pain scale 1-10.


Atorvastatin Calcium (Lipitor)  20 mg PO DAILY [facility]


   Last Admin: 03/10/19 08:46 Dose:  20 mg


Carbidopa/Levodopa (Sinemet)  1 tab PO TID@0700,1200,1700 [facility]


   Last Admin: 03/10/19 17:16 Dose:  1 tab


Cyanocobalamin (Vitamin B12 1000 Mcg Tab)  1,000 mcg PO DAILY [facility]


   Last Admin: 03/10/19 08:46 Dose:  1,000 mcg


Insulin Human Regular (Humulin R)  0 units SC [facility]; Protocol


   Last Admin: 03/10/19 21:25 Dose:  Not Given


Polyethylene Glycol (Miralax)  17 gm PO HS [facility]


   Last Admin: 03/10/19 21:22 Dose:  17 gm


Propranolol HCl (Inderal)  10 mg PO Q6 [facility]


   Last Admin: 03/10/19 17:17 Dose:  Not Given


Ramipril (Altace)  2.5 mg PO DAILY [facility]


   Last Admin: 03/10/19 08:47 Dose:  2.5 mg


Sertraline HCl (Zoloft)  100 mg PO DAILY [facility]


   Last Admin: 03/10/19 08:46 Dose:  100 mg











- Labs


Labs: 


                                        





                                 02/24/19 05:30 





                                 02/24/19 05:30 





                                        











PT  12.8 Seconds (9.8-13.1)   02/24/19  05:30    


 


INR  1.1   02/24/19  05:30    














- Head Exam


Head Exam: NORMAL INSPECTION





- Eye Exam


Eye Exam: Normal appearance





- ENT Exam


ENT Exam: Mucous Membranes Moist





- Respiratory Exam


Respiratory Exam: Clear to Ausculation Bilateral





- Cardiovascular Exam


Cardiovascular Exam: REGULAR RHYTHM





- GI/Abdominal Exam


GI & Abdominal Exam: Normal Bowel Sounds





- Neurological Exam


Neurological Exam: Awake





Assessment and Plan


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


Status: Acute   





(2) Essential tremor


Status: Acute   





(3) Hypertension


Status: Acute   





(4) Subdural hematoma


Status: Acute   





- Assessment and Plan (Free Text)


Plan: 





Cont meds


 COn ttx


COnt PT


Follow up with Neuro

## 2019-03-11 VITALS — RESPIRATION RATE: 20 BRPM

## 2019-03-11 RX ADMIN — POLYETHYLENE GLYCOL 3350 SCH: 17 POWDER, FOR SOLUTION ORAL at 21:26

## 2019-03-11 NOTE — CP.PCM.PN
Subjective





- Date & Time of Evaluation


Date of Evaluation: 03/11/19


Time of Evaluation: 17:54





- Subjective


Subjective: 





Patient seen in the room


in good spirits


happy with improvement


aware going to [facility] tomorrow


denies pain


scalp CDI





Objective





- Vital Signs/Intake and Output


Vital Signs (last 24 hours): 


                                        











Temp Pulse Resp BP Pulse Ox


 


 98.1 F   85   18   140/86   96 


 


 03/11/19 07:24  03/11/19 08:41  03/11/19 07:24  03/11/19 08:39  03/11/19 08:41











- Medications


Medications: 


                               Current Medications





Acetaminophen (Tylenol 325mg Tab)  650 mg PO Q6 PRN


   PRN Reason: Headache


Acetaminophen (Tylenol 325mg Tab)  650 mg PO Q4 PRN


   PRN Reason: Pain scale 1-10.


Atorvastatin Calcium (Lipitor)  20 mg PO DAILY [facility]


   Last Admin: 03/11/19 08:39 Dose:  20 mg


Carbidopa/Levodopa (Sinemet)  1 tab PO TID@0700,1200,1700 [facility]


   Last Admin: 03/11/19 17:32 Dose:  1 tab


Cyanocobalamin (Vitamin B12 1000 Mcg Tab)  1,000 mcg PO DAILY [facility]


   Last Admin: 03/11/19 08:40 Dose:  1,000 mcg


Insulin Human Regular (Humulin R)  0 units SC [facility]; Protocol


   Last Admin: 03/11/19 17:31 Dose:  Not Given


Polyethylene Glycol (Miralax)  17 gm PO HS [facility]


   Last Admin: 03/10/19 21:22 Dose:  17 gm


Ramipril (Altace)  2.5 mg PO DAILY [facility]


   Last Admin: 03/11/19 08:39 Dose:  2.5 mg


Sertraline HCl (Zoloft)  100 mg PO DAILY [facility]


   Last Admin: 03/11/19 08:40 Dose:  100 mg











- Labs


Labs: 


                                        





                                 02/24/19 05:30 





                                 02/24/19 05:30 





                                        











PT  12.8 Seconds (9.8-13.1)   02/24/19  05:30    


 


INR  1.1   02/24/19  05:30

## 2019-03-11 NOTE — CP.PCM.PN
Subjective





- Date & Time of Evaluation


Date of Evaluation: 03/11/19


Time of Evaluation: 10:00





- Subjective


Subjective: 





patient seen and examined at bedside


Interim events noted


No complaints offered at this time


denies cp/sob/fever/chills.


available diagnostic data reviewed





Review of Systems All systems: reviewed and no additional remarkable complaints 

except mentioned above





Objective





Vital Signs Stable


- Constitutional


Appears: Non-toxic, No Acute Distress





Head Exam: NORMAL INSPECTION, incision c/d/i





Eye Exam: Normal appearance





Respiratory Exam: NORMAL BREATHING PATTERN





Cardiovascular Exam: +S1, +S2





GI & Abdominal Exam: Soft





Neurological Exam: Alert, Awake





Psychiatric exam: Normal Affect, Normal Mood





Skin Exam: Normal Color, Warm





Assessment and Plan





monitor vitals


monitor labs


Cont meds


Cont therapy


consultants appreciated input


rest of plan as ordered





Objective





- Vital Signs/Intake and Output


Vital Signs (last 24 hours): 


                                        











Temp Pulse Resp BP Pulse Ox


 


 98.1 F   85   18   140/86   96 


 


 03/11/19 07:24  03/11/19 08:41  03/11/19 07:24  03/11/19 08:39  03/11/19 08:41











- Medications


Medications: 


                               Current Medications





Acetaminophen (Tylenol 325mg Tab)  650 mg PO Q6 PRN


   PRN Reason: Headache


Acetaminophen (Tylenol 325mg Tab)  650 mg PO Q4 PRN


   PRN Reason: Pain scale 1-10.


Atorvastatin Calcium (Lipitor)  20 mg PO DAILY [facility]


   Last Admin: 03/11/19 08:39 Dose:  20 mg


Carbidopa/Levodopa (Sinemet)  1 tab PO TID@0700,1200,1700 [facility]


   Last Admin: 03/11/19 17:32 Dose:  1 tab


Cyanocobalamin (Vitamin B12 1000 Mcg Tab)  1,000 mcg PO DAILY [facility]


   Last Admin: 03/11/19 08:40 Dose:  1,000 mcg


Insulin Human Regular (Humulin R)  0 units SC [facility]; Protocol


   Last Admin: 03/11/19 17:31 Dose:  Not Given


Polyethylene Glycol (Miralax)  17 gm PO [facility]


   Last Admin: 03/10/19 21:22 Dose:  17 gm


Ramipril (Altace)  2.5 mg PO DAILY [facility]


   Last Admin: 03/11/19 08:39 Dose:  2.5 mg


Sertraline HCl (Zoloft)  100 mg PO DAILY [facility]


   Last Admin: 03/11/19 08:40 Dose:  100 mg











- Labs


Labs: 


                                        





                                 02/24/19 05:30 





                                 02/24/19 05:30 





                                        











PT  12.8 Seconds (9.8-13.1)   02/24/19  05:30    


 


INR  1.1   02/24/19  05:30    














Assessment and Plan


(1) Subdural hematoma


Status: Acute   





(2) Fall


Status: Acute

## 2019-03-11 NOTE — CP.PCM.PN
Subjective





- Date & Time of Evaluation


Date of Evaluation: 03/11/19


Time of Evaluation: 10:45





- Subjective


Subjective: 





Neuro Follow up Note:





Mr. Burnett was evaluated this morning at bedside in acute rehab.  He admits that

his hand tremors have continued to improve since being started on Sinemet and 

since I last saw him last week.  He offers no complaints today.  He is for 

possible d/c to [facility] [place] [facility] [place] tomorrow.  Presently denies h/a, dizziness, 

visual changes, chest pain, sob, abd pain, n/v/d, paresthesias.





Objective





- Vital Signs/Intake and Output


Vital Signs (last 24 hours): 


                                        











Temp Pulse Resp BP Pulse Ox


 


 98.1 F   85   18   140/86   96 


 


 03/11/19 07:24  03/11/19 08:41  03/11/19 07:24  03/11/19 08:39  03/11/19 08:41











- Medications


Medications: 


                               Current Medications





Acetaminophen (Tylenol 325mg Tab)  650 mg PO Q6 PRN


   PRN Reason: Headache


Acetaminophen (Tylenol 325mg Tab)  650 mg PO Q4 PRN


   PRN Reason: Pain scale 1-10.


Atorvastatin Calcium (Lipitor)  [facility]


   Last Admin: 03/11/19 08:39 Dose:  20 mg


Carbidopa/Levodopa (Sinemet)  1 tab PO TID@0700,1200,1700 [facility]


   Last Admin: 03/11/19 06:51 Dose:  1 tab


Cyanocobalamin (Vitamin B12 1000 Mcg Tab)  1,000 mcg PO DAILY [facility]


   Last Admin: 03/11/19 08:40 Dose:  1,000 mcg


Insulin Human Regular (Humulin R)  0 units SC [facility]; Protocol


   Last Admin: 03/11/19 06:34 Dose:  Not Given


Polyethylene Glycol (Miralax)  17 gm PO HS [facility]


   Last Admin: 03/10/19 21:22 Dose:  17 gm


Ramipril (Altace)  2.5 mg PO DAILY [facility]


   Last Admin: 03/11/19 08:39 Dose:  2.5 mg


Sertraline HCl (Zoloft)  100 mg PO DAILY [facility]


   Last Admin: 03/11/19 08:40 Dose:  100 mg











- Labs


Labs: 


                                        





                                 02/24/19 05:30 





                                 02/24/19 05:30 





                                        











PT  12.8 Seconds (9.8-13.1)   02/24/19  05:30    


 


INR  1.1   02/24/19  05:30    














- Constitutional


Appears: Well, Non-toxic, No Acute Distress





- Head Exam


Head Exam: NORMOCEPHALIC


Additional comments: 





Post-surgical incision site noted to left parietal area, open to air, healing 

well.











- Eye Exam


Eye Exam: EOMI, Normal appearance, PERRL


Pupil Exam: NORMAL ACCOMODATION, PERRL





- ENT Exam


ENT Exam: Mucous Membranes Moist





- Neck Exam


Neck Exam: Full ROM, Normal Inspection





- Respiratory Exam


Respiratory Exam: NORMAL BREATHING PATTERN





- Extremities Exam


Extremities Exam: Full ROM.  absent: Calf Tenderness, Pedal Edema





- Neurological Exam


Neurological Exam: Alert, Awake, CN II-XII Intact, Oriented x3, Reflexes Normal


Neuro motor strength exam: Left Upper Extremity: 5, Right Upper Extremity: 5, 

Left Lower Extremity: 4, Right Lower Extremity: 4


Additional comments: 


Speech clear, fluid


Generalized weakness to BLE improving


+ slight left hand tremor noted, improving


No bradykinesia or cogwheeling noted.





- Psychiatric Exam


Psychiatric exam: Normal Affect, Normal Mood





- Skin


Skin Exam: Normal Color


Additional comments: 





Post-surgical incision site noted to left parietal area, open to air, healing 

well.





Assessment and Plan


(1) Essential tremor


Assessment & Plan: 


-Continue Sinemet as ordered at 7am, 12pm, and 5pm---to be continued while at 

[facility].


-Continue therapy as tolerated.


-Recommend Sid scan as outpatient to r/o Parkinsons.  Pt may f/u with Dr. Vaz in

the office within 1 month. 


-Notify neuro team of any acute changes to pt's condition prior to pt's d/c.








Thank you for allowing us to participate in this pt's care.


Meron Moya, MARGARITA, APN


Case discussed with Dr. Vaz


Status: Acute

## 2019-03-12 VITALS — OXYGEN SATURATION: 97 % | HEART RATE: 78 BPM

## 2019-03-12 VITALS — TEMPERATURE: 97.3 F | DIASTOLIC BLOOD PRESSURE: 77 MMHG | SYSTOLIC BLOOD PRESSURE: 129 MMHG

## 2019-04-12 ENCOUNTER — HOSPITAL ENCOUNTER (EMERGENCY)
Dept: HOSPITAL 14 - H.ER | Age: 74
Discharge: NURSING, REHAB OR LONG-TERM CARE | End: 2019-04-12
Payer: MEDICARE

## 2019-04-12 VITALS
OXYGEN SATURATION: 97 % | HEART RATE: 83 BPM | DIASTOLIC BLOOD PRESSURE: 85 MMHG | SYSTOLIC BLOOD PRESSURE: 145 MMHG | TEMPERATURE: 98.4 F | RESPIRATION RATE: 18 BRPM

## 2019-04-12 VITALS — BODY MASS INDEX: 21.7 KG/M2

## 2019-04-12 DIAGNOSIS — S09.90XA: Primary | ICD-10-CM

## 2019-04-12 DIAGNOSIS — E11.9: ICD-10-CM

## 2019-04-12 DIAGNOSIS — Z86.59: ICD-10-CM

## 2019-04-12 DIAGNOSIS — I10: ICD-10-CM

## 2019-04-12 NOTE — ED PDOC
HPI: Trauma/Fall





- HPI


Time Seen by Provider: 04/12/19 15:15


Chief Complaint (Nursing): Trauma


Chief Complaint (Provider): Trauma


History Per: Patient


Onset/Duration Of Symptoms: Hrs (1)


Additional Complaint(s): 


74 y/o male presents to the ED complaining of head injury that happen 1 hour 

ago. Patient was sent from [facility]. Patient was sitting on the 

wheel chair and fell backwards and hit his head. He states he had a headache 

after the accident. Patient denies any LOC, chest pain, syncope, dizziness, or 

vomiting.  





PMD: none provided











Past Medical History


Reviewed: Historical Data, Nursing Documentation, Vital Signs


Vital Signs: 





                                Last Vital Signs











Temp  98.4 F   04/12/19 15:05


 


Pulse  94 H  04/12/19 15:05


 


Resp  18   04/12/19 15:05


 


BP  152/94 H  04/12/19 15:05


 


Pulse Ox  97   04/12/19 15:05














- Medical History


PMH: Arthritis, Cardia Arrhythmia, Depression, Diabetes, HTN, 

Hypercholesterolemia





- Surgical History


Other surgeries: Brain surgery.





- Family History


Family History: States: No Known Family Hx





- Home Medications


Home Medications: 


                                Ambulatory Orders











 Medication  Instructions  Recorded


 


Atorvastatin [Lipitor] 20 mg PO DAILY 02/15/19


 


Ramipril [Altace] 2.5 mg PO DAILY 02/15/19


 


Acetaminophen [Tylenol] 650 mg PO Q4 PRN 02/22/19


 


Acetaminophen [Tylenol] 650 mg PO Q6 PRN 02/22/19


 


Insulin Human Regular-MED [HumuLIN See Protocol [facility] 02/22/19





R MED]  


 


Polyethylene Glycol 3350 [Miralax] 17 gm PO HS 02/22/19


 


Propranolol [Inderal] 10 mg PO TID 02/22/19


 


Acetaminophen [Tylenol 325mg tab] 650 mg PO Q4 PRN  tab 03/12/19


 


Acetaminophen [Tylenol 325mg tab] 650 mg PO Q6 PRN  tab 03/12/19


 


Atorvastatin [Lipitor] 20 mg PO DAILY  tab 03/12/19


 


Carbidopa/Levodopa 25/100 mg 1 tab PO TID@0700,1200,1700  tab 03/12/19





[Sinemet]  


 


Cyanocobalamin [Vitamin B12 1000 1,000 mcg PO DAILY  tab 03/12/19





mcg Tab]  


 


Insulin Human Regular [HumuLIN R] 0 units SC [facility]  ml 03/12/19


 


Polyethylene Glycol 3350 [Miralax] 17 gm PO HS  packet 03/12/19


 


Ramipril [Altace] 2.5 mg PO DAILY  cap 03/12/19


 


Sertraline [Zoloft] 100 mg PO DAILY  tab 03/12/19














- Allergies


Allergies/Adverse Reactions: 


                                    Allergies











Allergy/AdvReac Type Severity Reaction Status Date / Time


 


No Known Allergies Allergy   Verified 04/12/19 15:05














Review of Systems


ROS Statement: Except As Marked, All Systems Reviewed And Found Negative


Cardiovascular: Negative for: Chest Pain


Gastrointestinal: Negative for: Vomiting


Neurological: Positive for: Headache, Other (No LOC.).  Negative for: Dizziness





Physical Exam





- Reviewed


Nursing Documentation Reviewed: Yes


Vital Signs Reviewed: Yes





- Physical Exam


Appears: Positive for: Well, Non-toxic, No Acute Distress


Head Exam: Positive for: ATRAUMATIC, NORMOCEPHALIC


Skin: Positive for: Normal Color, Warm, Dry


Eye Exam: Positive for: EOMI, Normal appearance, PERRL


ENT: Positive for: Normal ENT Inspection


Neck: Positive for: Normal, Painless ROM, Supple


Cardiovascular/Chest: Positive for: Regular Rate, Rhythm.  Negative for: Murmur


Respiratory: Positive for: Normal Breath Sounds.  Negative for: Respiratory 

Distress


Gastrointestinal/Abdominal: Positive for: Normal Exam, Soft.  Negative for: 

Tenderness


Back: Positive for: Normal Inspection.  Negative for: L CVA Tenderness, R CVA 

Tenderness


Extremity: Positive for: Normal ROM.  Negative for: Pedal Edema


Neurological/Psych: Positive for: Awake, Alert, Normal Tone, Oriented (x3), 

Other (Difficult to ambulate and is getting physical therapy.).  Negative for: 

Motor/Sensory Deficits





- ECG


O2 Sat by Pulse Oximetry: 97





Medical Decision Making


Medical Decision Making: 


Time:1542


Initial Impression: Head injury rule out intracranial bleeding. Will get CT 

head.


Initial Plan:


-CT head





16:42


PROCEDURE:  CT HEAD WITHOUT CONTRAST.


HISTORY:


head injury headache


COMPARISON:


2/20/2019


TECHNIQUE:


Axial computed tomography images were obtained through the head/brain without 

intravenous contrast.  


Radiation dose:


Total exam DLP = 1136.31 mGy-cm.


This CT exam was performed using one or more of the following dose reduction 

techniques: Automated exposure control, adjustment of the mA and/or kV according

 to patient size, and/or use of iterative reconstruction technique.


FINDINGS:


HEMORRHAGE:


Intermediate attenuation right frontal subdural convexity hemorrhage up to 7 mm 

in width.  Likely subacute.  There are some small areas of associated high 

attenuation within this collection which may represent small amount of acute 

hemorrhage..  There is some linear high attenuation likely related to prior 

surgery.  The patient is status post right frontal craniotomy.  No other extra-

axial hemorrhage identified.  No parenchymal hemorrhage.  No intraventricular 

hemorrhage. 


BRAIN:


No mass effect or edema.  Mild periventricular white matter ischemic change, 

consistent with age.  Mild diffuse atrophy.


VENTRICLES:


Unremarkable. No hydrocephalus. 


CALVARIUM:


Right frontal craniotomy.


PARANASAL SINUSES:


Unremarkable as visualized. No significant inflammatory changes.


MASTOID AIR CELLS:


Unremarkable as visualized. No inflammatory changes.


OTHER FINDINGS:


None.


IMPRESSION:


Probable subacute right frontal convexity subdural hemorrhage up to 7 mm in 

width.  Questionable very small amounts of acute blood within this subacute 

collection.  Status post right frontal craniotomy.





1730 Discussed with Dr Acuna who reviewed CT head and recommends no 

observation or intervention and states that patient is stable for discharge from

 Neurosurgical point. 





-------------------------------

------------------------------------------------------------------


Scribe Attestation:


Documented by Julieth Hendrix, acting as a scribe for Candy Leal


 


Provider Scribe Attestation:


All medical record entries made by the Scribe were at my direction and 

personally dictated by me. I have reviewed the chart and agree that the record 

accurately reflects my personal performance of the history, physical exam, 

medical decision making, and the department course for this patient. I have also

 personally directed, reviewed, and agree with the discharge instructions and 

disposition.  











Disposition





- Clinical Impression


Clinical Impression: 


 Head injury








- Patient ED Disposition


Is Patient to be Admitted: No


Discussed With : Hayder Rodriguez


Doctor Will See Patient In The: Office


Counseled Patient/Family Regarding: Studies Performed, Diagnosis, Need For 

Followup





- Disposition


Referrals: 


Harsha Katz MD [Staff Provider] - 


Disposition: Routine/Home


Disposition Time: 18:07


Condition: GOOD


Additional Instructions: 





SADIE MEDRANO, thank you for letting us take care of you today. Your provider 

was Candy Leal MD and you were treated for FALL;HEAD PAIN. The 

emergency medical care you received today was directed at your acute symptoms. 

If you were prescribed any medication, please fill it and take as directed. It 

may take several days for your symptoms to resolve. Return to the Emergency 

Department if your symptoms worsen, do not improve, or if you have any other 

problems.





Please contact your doctor or call one of the physicians/clinics you have been 

referred to that are listed on the Patient Visit Information form that is 

included in your discharge packet. Bring any paperwork you were given at 

discharge with you along with any medications you are taking to your follow up 

visit. Our treatment cannot replace ongoing medical care by a primary care 

provider outside of the emergency department.





Thank you for allowing the [facility] team to be part of your care today.








If you had an X-Ray or CT scan: A Radiologist will review the ED reading if any 

change in treatment is needed we will contact you.***








Instructions:  Closed Head Injury (DC)


Print Language: Icelandic

## 2019-04-12 NOTE — CT
Date of service: 



04/12/2019



PROCEDURE:  CT HEAD WITHOUT CONTRAST.



HISTORY:

head injury headache



COMPARISON:

2/20/2019



TECHNIQUE:

Axial computed tomography images were obtained through the head/brain 

without intravenous contrast.  



Radiation dose:



Total exam DLP = 1136.31 mGy-cm.



This CT exam was performed using one or more of the following dose 

reduction techniques: Automated exposure control, adjustment of the 

mA and/or kV according to patient size, and/or use of iterative 

reconstruction technique.



FINDINGS:



HEMORRHAGE:

Intermediate attenuation right frontal subdural convexity hemorrhage 

up to 7 mm in width.  Likely subacute.  There are some small areas of 

associated high attenuation within this collection which may 

represent small amount of acute hemorrhage..  There is some linear 

high attenuation likely related to prior surgery.  The patient is 

status post right frontal craniotomy.  No other extra-axial 

hemorrhage identified.  No parenchymal hemorrhage.  No 

intraventricular hemorrhage. 



BRAIN:

No mass effect or edema.  Mild periventricular white matter ischemic 

change, consistent with age.  Mild diffuse atrophy.



VENTRICLES:

Unremarkable. No hydrocephalus. 



CALVARIUM:

Right frontal craniotomy.



PARANASAL SINUSES:

Unremarkable as visualized. No significant inflammatory changes.



MASTOID AIR CELLS:

Unremarkable as visualized. No inflammatory changes.



OTHER FINDINGS:

None.



IMPRESSION:

Probable subacute right frontal convexity subdural hemorrhage up to 7 

mm in width.  Questionable very small amounts of acute blood within 

this subacute collection.  Status post right frontal craniotomy.